# Patient Record
Sex: FEMALE | Race: WHITE | NOT HISPANIC OR LATINO | Employment: FULL TIME | URBAN - NONMETROPOLITAN AREA
[De-identification: names, ages, dates, MRNs, and addresses within clinical notes are randomized per-mention and may not be internally consistent; named-entity substitution may affect disease eponyms.]

---

## 2017-05-22 ENCOUNTER — COMMUNICATION - GICH (OUTPATIENT)
Dept: FAMILY MEDICINE | Facility: OTHER | Age: 47
End: 2017-05-22

## 2017-05-22 DIAGNOSIS — I10 ESSENTIAL (PRIMARY) HYPERTENSION: ICD-10-CM

## 2017-06-05 ENCOUNTER — COMMUNICATION - GICH (OUTPATIENT)
Dept: FAMILY MEDICINE | Facility: OTHER | Age: 47
End: 2017-06-05

## 2017-06-05 DIAGNOSIS — F34.1 DYSTHYMIC DISORDER: ICD-10-CM

## 2017-06-07 ENCOUNTER — HISTORY (OUTPATIENT)
Dept: FAMILY MEDICINE | Facility: OTHER | Age: 47
End: 2017-06-07

## 2017-06-07 ENCOUNTER — AMBULATORY - GICH (OUTPATIENT)
Dept: FAMILY MEDICINE | Facility: OTHER | Age: 47
End: 2017-06-07

## 2017-06-07 DIAGNOSIS — Z12.31 ENCOUNTER FOR SCREENING MAMMOGRAM FOR MALIGNANT NEOPLASM OF BREAST: ICD-10-CM

## 2017-06-07 DIAGNOSIS — Z80.3 FAMILY HISTORY OF MALIGNANT NEOPLASM OF BREAST: ICD-10-CM

## 2017-06-07 DIAGNOSIS — N62 HYPERTROPHY OF BREAST: ICD-10-CM

## 2017-06-07 DIAGNOSIS — Z01.818 ENCOUNTER FOR OTHER PREPROCEDURAL EXAMINATION: ICD-10-CM

## 2017-06-07 DIAGNOSIS — Z23 ENCOUNTER FOR IMMUNIZATION: ICD-10-CM

## 2017-06-07 DIAGNOSIS — F34.1 DYSTHYMIC DISORDER: ICD-10-CM

## 2017-06-07 DIAGNOSIS — I10 ESSENTIAL (PRIMARY) HYPERTENSION: ICD-10-CM

## 2017-06-07 LAB
ABSOLUTE BASOPHILS - HISTORICAL: 0.1 THOU/CU MM
ABSOLUTE EOSINOPHILS - HISTORICAL: 0.1 THOU/CU MM
ABSOLUTE LYMPHOCYTES - HISTORICAL: 1.8 THOU/CU MM (ref 0.9–2.9)
ABSOLUTE MONOCYTES - HISTORICAL: 0.4 THOU/CU MM
ABSOLUTE NEUTROPHILS - HISTORICAL: 4.2 THOU/CU MM (ref 1.7–7)
BASOPHILS # BLD AUTO: 1.1 %
EOSINOPHIL NFR BLD AUTO: 2 %
ERYTHROCYTE [DISTWIDTH] IN BLOOD BY AUTOMATED COUNT: 15.6 % (ref 11.5–15.5)
HCG UR QL: NEGATIVE
HCT VFR BLD AUTO: 38.1 % (ref 33–51)
HEMOGLOBIN: 12.8 G/DL (ref 12–16)
LYMPHOCYTES NFR BLD AUTO: 26.6 % (ref 20–44)
MCH RBC QN AUTO: 30.9 PG (ref 26–34)
MCHC RBC AUTO-ENTMCNC: 33.6 G/DL (ref 32–36)
MCV RBC AUTO: 92 FL (ref 80–100)
MONOCYTES NFR BLD AUTO: 6.4 %
NEUTROPHILS NFR BLD AUTO: 63.6 % (ref 42–72)
PLATELET # BLD AUTO: 335 THOU/CU MM (ref 140–440)
PMV BLD: 10.2 FL (ref 6.5–11)
RED BLOOD COUNT - HISTORICAL: 4.14 MIL/CU MM (ref 4–5.2)
WHITE BLOOD COUNT - HISTORICAL: 6.6 THOU/CU MM (ref 4.5–11)

## 2017-06-12 ENCOUNTER — HOSPITAL ENCOUNTER (OUTPATIENT)
Dept: RADIOLOGY | Facility: OTHER | Age: 47
End: 2017-06-12
Attending: FAMILY MEDICINE

## 2017-06-12 ENCOUNTER — HISTORY (OUTPATIENT)
Dept: RADIOLOGY | Facility: OTHER | Age: 47
End: 2017-06-12

## 2017-06-12 DIAGNOSIS — Z12.31 ENCOUNTER FOR SCREENING MAMMOGRAM FOR MALIGNANT NEOPLASM OF BREAST: ICD-10-CM

## 2017-06-16 ENCOUNTER — COMMUNICATION - GICH (OUTPATIENT)
Dept: FAMILY MEDICINE | Facility: OTHER | Age: 47
End: 2017-06-16

## 2017-06-19 ENCOUNTER — HISTORY (OUTPATIENT)
Dept: FAMILY MEDICINE | Facility: OTHER | Age: 47
End: 2017-06-19

## 2017-06-19 ENCOUNTER — OFFICE VISIT - GICH (OUTPATIENT)
Dept: FAMILY MEDICINE | Facility: OTHER | Age: 47
End: 2017-06-19

## 2017-06-19 DIAGNOSIS — I10 ESSENTIAL (PRIMARY) HYPERTENSION: ICD-10-CM

## 2017-07-06 ENCOUNTER — COMMUNICATION - GICH (OUTPATIENT)
Dept: FAMILY MEDICINE | Facility: OTHER | Age: 47
End: 2017-07-06

## 2017-07-10 ENCOUNTER — OFFICE VISIT - GICH (OUTPATIENT)
Dept: FAMILY MEDICINE | Facility: OTHER | Age: 47
End: 2017-07-10

## 2017-07-10 DIAGNOSIS — I10 ESSENTIAL (PRIMARY) HYPERTENSION: ICD-10-CM

## 2017-08-27 ENCOUNTER — COMMUNICATION - GICH (OUTPATIENT)
Dept: FAMILY MEDICINE | Facility: OTHER | Age: 47
End: 2017-08-27

## 2017-08-27 DIAGNOSIS — F34.1 DYSTHYMIC DISORDER: ICD-10-CM

## 2017-08-28 ENCOUNTER — COMMUNICATION - GICH (OUTPATIENT)
Dept: FAMILY MEDICINE | Facility: OTHER | Age: 47
End: 2017-08-28

## 2017-08-28 DIAGNOSIS — I10 ESSENTIAL (PRIMARY) HYPERTENSION: ICD-10-CM

## 2017-11-28 ENCOUNTER — COMMUNICATION - GICH (OUTPATIENT)
Dept: FAMILY MEDICINE | Facility: OTHER | Age: 47
End: 2017-11-28

## 2017-11-28 DIAGNOSIS — I10 ESSENTIAL (PRIMARY) HYPERTENSION: ICD-10-CM

## 2017-12-27 NOTE — PROGRESS NOTES
Patient Information     Patient Name MRN Sex Otis Trivedi [id number] Female 1970      Progress Notes by Cheyenne Bacon MD at 7/10/2017  9:15 AM     Author:  Cheyenne Bacon MD Service:  (none) Author Type:  Physician     Filed:  7/10/2017  3:34 PM Encounter Date:  7/10/2017 Status:  Signed     :  Cheyenne Bacon MD (Physician)            SUBJECTIVE:    Otsi Osorio is a 47 y.o. female who presents for follow up.     HPI Comments: Patient is tolerating new blood pressure medication without side effects. blood pressure generally improved, but not within goal range.   She is open to further adjustment of blood pressure medication.       REVIEW OF SYSTEMS:  ROS see HPI    OBJECTIVE:  /88  Pulse 60  Wt 83.9 kg (185 lb)  BMI 34.39 kg/m2    EXAM:   Physical Exam   Constitutional: She is oriented to person, place, and time and well-developed, well-nourished, and in no distress.   Eyes: Conjunctivae are normal.   Cardiovascular: Normal rate.    Pulmonary/Chest: Effort normal.   Neurological: She is alert and oriented to person, place, and time.   Skin: No rash noted.   Psychiatric: Mood and affect normal.       ASSESSMENT/PLAN:    ICD-10-CM    1. HYPERTENSION I10 lisinopril (PRINIVIL; ZESTRIL) 40 mg tablet        Plan:  Increase lisinopril to 40 mg daily. Follow up in 6-8 weeks for recheck, labs. If blood pressure still above goal, consider adding norvasc or HCTZ.       Cheyenne Bacon MD

## 2017-12-27 NOTE — PROGRESS NOTES
Patient Information     Patient Name MRN Sex Otis Trivedi [id number] Female 1970      Progress Notes by Marva Cheatham at 2017 10:23 AM     Author:  Marva Cheatahm Service:  (none) Author Type:  (none)     Filed:  2017 10:23 AM Date of Service:  2017 10:23 AM Status:  Signed     :  Marva Cheatham            Falls Risk Criteria:    Age 65 and older or under age 4        Sensory deficits    Poor vision    Use of ambulatory aides    Impaired judgment    Unable to walk independently    Meets High Risk criteria for falls:  no

## 2017-12-28 NOTE — TELEPHONE ENCOUNTER
Patient Information     Patient Name MRN Sex Otis Trivedi [id number] Female 1970      Telephone Encounter by Lucita Roblero at 2017  3:01 PM     Author:  Lucita Roblero Service:  (none) Author Type:  NURS- Student Practical Nurse     Filed:  2017  3:02 PM Encounter Date:  2017 Status:  Signed     :  Lucita Roblero (NURS- Student Practical Nurse)            Called and left VM on patients phone stating that a copy of her Pre-op was faxed successfully to [facility]. Instructed patient to call clinic back with any other questions.   Lucita Roblero ....................  2017   3:02 PM

## 2017-12-28 NOTE — PROGRESS NOTES
Patient Information     Patient Name MRN Sex Otis Trivedi [id number] Female 1970      Progress Notes by Cheyenne Bacon MD at 2017 10:00 AM     Author:  Cheyenne Bacon MD Service:  (none) Author Type:  Physician     Filed:  2017 12:23 PM Encounter Date:  2017 Status:  Signed     :  Cheyenne Bacon MD (Physician)            SUBJECTIVE:    Otis Osorio is a 47 y.o. female who presents for HTN.     HPI Comments: Patient presents for follow up on her blood pressure.   Patient has had an elevated blood pressure for years.   She is often inconsistent with her use of blood pressure medications.  She recently had a pre-op evaluation and her blood pressure was quite high.   She presented for elective breast reduction surgery, was actually wheeled in for surgery, when they decided to not proceed due to a blood pressure reading of 138/89 and because of her history.   The patient is now interested in additional medication to manage her blood pressure.  No chest, SOB or other symptoms.     REVIEW OF SYSTEMS:  ROS see HPI, ROS otherwise negative.     OBJECTIVE:  BP (!) 182/92  Pulse 68  Wt 84 kg (185 lb 3.2 oz)  LMP 2017 (Approximate)  BMI 34.43 kg/m2    EXAM:   Physical Exam   Constitutional: She is oriented to person, place, and time and well-developed, well-nourished, and in no distress.   Eyes: Conjunctivae are normal.   Cardiovascular: Normal rate.    Pulmonary/Chest: Effort normal.   Neurological: She is alert and oriented to person, place, and time.   Skin: No rash noted.   Psychiatric: Mood and affect normal.       ASSESSMENT/PLAN:    ICD-10-CM    1. HYPERTENSION I10 lisinopril (PRINIVIL; ZESTRIL) 20 mg tablet        Plan:  Long discussion with the patient on importance of managing blood pressure. Will start lisinopril at 20 mg daily. She is rescheduled for surgery the end of July (or perhaps mid August if she decided to go to [place] instead) so will have her  follow up in 2 weeks for recheck of her blood pressure. likely will need higher dose of lisinopril and/or and additional medication, and this is explained to the patient. Discussed lifestyle and dietary recommendations.     Cheyenne Bacon MD

## 2017-12-28 NOTE — TELEPHONE ENCOUNTER
Patient Information     Patient Name MRN Otis Pardo [id number] Female 1970      Telephone Encounter by Peri Woods RN at 2017 12:33 PM     Author:  Peri Woods RN Service:  (none) Author Type:  NURS- Registered Nurse     Filed:  2017 12:34 PM Encounter Date:  2017 Status:  Signed     :  Peri Woods RN (NURS- Registered Nurse)            Filled 16 90 x 3. Pharmacy alerted. Unable to complete prescription refill per RN Medication Refill Policy.................... Peri Woods RN ....................  2017   12:34 PM    Prescribing Provider: John Jaffe MD         Order Date: 2016  Ordered by: JOHN JAFFE  Medication:venlafaxine (EFFEXOR XR) 75 mg cp24 Extended-Release capsule    Qty:90 capsule  Ref:3  Start:2016    End:              Route:Oral                  GINNA:No   Class:eRx    Sig:Take 1 capsule by mouth once daily with a meal.    Pharmacy:[facility] - [place], [place] - [facility] [address]

## 2017-12-28 NOTE — TELEPHONE ENCOUNTER
Patient Information     Patient Name MRN Sex Otis Trivedi [id number] Female 1970      Telephone Encounter by Peri Woods RN at 2017  9:10 AM     Author:  Peri Woods RN Service:  (none) Author Type:  NURS- Registered Nurse     Filed:  2017  9:14 AM Encounter Date:  2017 Status:  Signed     :  Peri Woods RN (NURS- Registered Nurse)            Beta Blockers   Office visit in the past 12 months or per provider note.  Last visit with JOHN JAFFE was on: 2016 Pre-op with with Dr. Meyer  17 and 07/10/17 OV with Dr. Bacon address Dx of HTN  Next visit with JOHN JAFFE is on: No future appointment listed with this provider  Next visit with Family Practice is on: No future appointment listed in this department  Max refill for 12 months from last office visit or per provider note.  Prescription refilled per RN Medication Refill Policy.................... Peri Woods RN ....................  2017   9:12 AM

## 2017-12-28 NOTE — PROGRESS NOTES
Patient Information     Patient Name MRN Sex Otis Feliz [id number] Female 1970      Progress Notes by Nancy Meyer MD at 2017  3:45 PM     Author:  Nancy Meyer MD Service:  (none) Author Type:  Physician     Filed:  2017  4:21 PM Encounter Date:  2017 Status:  Signed     :  Nancy Meyer MD (Physician)              PREOPERATIVE CLEARANCE  Date of Exam: 2017    Nursing Notes:   Brielle Bowling  2017  3:40 PM  Signed  -This patient presents today for a Preoperative exam for this procedure: Breast reduction bilateral   Date of Surgery:    Surgeon:  Dr. Fletcher  Facility:  [facility]  -Fax:   [phone]  Haily Bowling LPN ...... 2017 3:35 PM      HPI:   Otis Osorio is a 47 y.o. Female here for preoperative clearance with history of hypertension for the above-noted or seizure. Her blood pressure is always high at the clinic with whitecoat syndrome noted. She also took her blood pressure pill little bit late today because she had been in [place] for her consultation. She has been checking it outside the clinic setting and has been well controlled.      Problem List:   Patient Active Problem List     Diagnosis  Code     BREAST CANCER, FAMILY HX Z80.3     FH: hemochromatosis Z83.49     DEPRESSION/ANXIETY F34.1     HYPERTENSION I10     Macromastia N62     White coat syndrome with diagnosis of hypertension I10      Histories:  Past Medical History:     Diagnosis  Date     Anxiety disorder      History of Helicobacter pylori infection      Hypertension       Past Surgical History:      Procedure  Laterality Date     BREAST BIOPSY       Breast bx - benign       KNEE ARTHROSCOPY  84, 90    Knee surgery x2       Social History     Social History        Marital status:       Spouse name: Michi     Number of children:  2     Years of education:  16     Occupational History        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[employer], ;      works as an  - he's not working much now due to illness (liver failure)-cirrhosis, awaiting transplant and having some neurological issues like Parkinson's                  Obstetric History       T2      L2     SAB0   TAB0   Ectopic0   Multiple0   Live Births0      Family History       Problem   Relation Age of Onset     Cancer-breast  Mother 49     metastatic breast cancer age 49;  age 50       Psychiatric illness  Father      Persistent depression       Psychiatric illness  Brother      anxiety disorder       Hypertension  Brother      Diabetes  Father      type 2 diabetes       Other  Father      hemachromatosis       Hypertension  Father       Allergies: Pollen extracts   Latex Allergy: no    Current Medications:  Current Outpatient Rx       Medication  Sig Dispense Refill     metoprolol succinate (TOPROL XL) 100 mg Sustained-Release tablet Take 1 tablet by mouth once daily. 90 tablet 3     venlafaxine (EFFEXOR XR) 75 mg cp24 Extended-Release capsule Take 1 capsule by mouth once daily with a meal. 90 capsule 3     Medications have been reviewed by me and are current to the best of my knowledge and ability.    Recent use of: no recent use of aspirin (ASA), NSAIDS or steroids    HABITS:   Social History       Substance Use Topics         Smoking status:   Never Smoker     Smokeless tobacco:   Never Used     Alcohol use   No      Comment: rare wine    Tetanus up to date TD , updated with TdaP today.    Proposed anesthesia: Per surgeonNone  Anesthesia  "Complications: None  History of abnormal bleeding : None  History of Blood Transfusions: No    Health Care Directive or Living Will: no    REVIEW OF SYSTEMS:  General: Denies general constitutional problems  Eyes: Denies problems  Ears/Nose/Throat: Denies problems  Cardiovascular: Denies problems  Respiratory: Denies problems  Gastrointestinal: Denies problems  Genitourinary - female: Menses irregular, no contraception  Musculoskeletal: Difficult for her to exercise in get the activity she wants with her breast size which impedes her physical activity  Skin: Denies problems  Neurologic: Denies problems  Psychiatric: Stress of spouse's ongoing health issues  Endocrine: Denies problems  Heme/Lymphatic: Denies problems  Allergic/Immunologic: Seasonal allergies    EXAM:   BP (!) 190/120  Pulse 62  Ht 1.562 m (5' 1.5\")  Wt 79.4 kg (175 lb)  LMP 04/24/2017 (Approximate)  BMI 32.53 kg/m2 Body mass index is 32.53 kg/(m^2).  General Appearance: Pleasant, alert, appropriate appearance for age. No acute distress  Ear Exam: Normal TM's bilaterally. Normal auditory canals and external ears. Non-tender.  Nose Exam: Normal external nose, mucus membranes, and septum.  OroPharynx Exam: Dental hygiene adequate. Normal buccal mucosa, crowded pharynx  Neck Exam: Supple, no masses or nodes, short  Thyroid Exam: No nodules or enlargement.  Chest/Respiratory Exam: Normal chest wall and respirations. Clear to auscultation.  Cardiovascular Exam: Regular rate and rhythm. S1, S2, no murmur, click, gallop, or rubs.  Musculoskeletal Exam: Back is straight and non-tender, full ROM of upper and lower extremities.  Skin: Normal. and no rash or abnormalities  Neurologic Exam: Nonfocal; symmetric DTRs, normal gross motor movement, tone, and coordination. No tremor.  Psychiatric Exam: Alert and oriented, appropriate affect.  Speech: normal   Affect: euthymic    DIAGNOSTICS:   1. EKG: EKG FINDINGS - Not indicated  2. CXR: Not indicated  3. Labs: "   Results for orders placed or performed in visit on 06/07/17      CBC WITH AUTO DIFFERENTIAL      Result  Value Ref Range    WHITE BLOOD COUNT         6.6 4.5 - 11.0 thou/cu mm    RED BLOOD COUNT           4.14 4.00 - 5.20 mil/cu mm    HEMOGLOBIN                12.8 12.0 - 16.0 g/dL    HEMATOCRIT                38.1 33.0 - 51.0 %    MCV                       92 80 - 100 fL    MCH                       30.9 26.0 - 34.0 pg    MCHC                      33.6 32.0 - 36.0 g/dL    RDW                       15.6 (H) 11.5 - 15.5 %    PLATELET COUNT            335 140 - 440 thou/cu mm    MPV                       10.2 6.5 - 11.0 fL    NEUTROPHILS               63.6 42.0 - 72.0 %    LYMPHOCYTES               26.6 20.0 - 44.0 %    MONOCYTES                 6.4 <12.0 %    EOSINOPHILS               2.0 <8.0 %    BASOPHILS                 1.1 <3.0 %    ABSOLUTE NEUTROPHILS      4.2 1.7 - 7.0 thou/cu mm    ABSOLUTE LYMPHOCYTES      1.8 0.9 - 2.9 thou/cu mm    ABSOLUTE MONOCYTES        0.4 <0.9 thou/cu mm    ABSOLUTE EOSINOPHILS      0.1 <0.5 thou/cu mm    ABSOLUTE BASOPHILS        0.1 <0.3 thou/cu mm   Urine pregnancy test (HCG), qualitative      Result  Value Ref Range    PREGNANCY,URINE           Negative Negative     I have personally reviewed the labs listed above.    4. Pre-op urine for pregnancy (for 12 yrs and older or menstruating): See above    IMPRESSION:   1. Preop examination    2. Macromastia    3. Screening mammogram, encounter for    4. White coat syndrome with diagnosis of hypertension    5. HYPERTENSION    6. DEPRESSION/ANXIETY    7. Need for Tdap vaccination    8. BREAST CANCER, FAMILY HX         For above listed surgery and anesthesia:   Patient is low risk for perioperative complications.  Instructed to continue to check blood pressure at home for good control since she is always elevated in the clinic setting.  RECOMMENDATIONS: proceed without further diagnostic evaluation and resume all medications  post-operative or per surgeon.  Last mammogram was greater than 1 year ago and we'll try to get mammogram completed prior to procedure however this is scheduled less than a week from now.  TdaP updated today.    Electronically Signed by:    Nancy Meyer MD  4:06 PM 6/7/2017

## 2017-12-28 NOTE — TELEPHONE ENCOUNTER
Patient Information     Patient Name MRN Otis Pardo [id number] Female 1970      Telephone Encounter by Jennifer Hernandez MD at 2017  3:44 PM     Author:  Jennifer Hernandez MD Service:  (none) Author Type:  Physician     Filed:  2017  3:44 PM Encounter Date:  2017 Status:  Signed     :  Jennifer Hernandez MD (Physician)            Please have her schedule a follow up visit.  Jennifer Hernandez MD

## 2017-12-28 NOTE — TELEPHONE ENCOUNTER
Patient Information     Patient Name MROtis Garnett [id number] Female 1970      Telephone Encounter by Marie Orr at 2017  3:24 PM     Author:  Marie Orr Service:  (none) Author Type:  (none)     Filed:  2017  3:27 PM Encounter Date:  2017 Status:  Signed     :  Marie Orr            Patient calling to let you know that her surgery was cancelled due to her BP being too high. She bought a monitor for home use and states it is consistentlyrunning high; from 168/109 to 140/89 at lowest. Wondering if there is another medication she should take or if she should increase what she is already on?  Please advise  Marie Orr LPN..............................2017  3:27 PM

## 2017-12-28 NOTE — TELEPHONE ENCOUNTER
Patient Information     Patient Name MRN Sex Otis Trivedi [id number] Female 1970      Telephone Encounter by Peri Woods RN at 2017  2:19 PM     Author:  Peri Woods RN Service:  (none) Author Type:  NURS- Registered Nurse     Filed:  2017  2:30 PM Encounter Date:  2017 Status:  Signed     :  Peri Woods RN (NURS- Registered Nurse)            Beta Blockers   Office visit in the past 12 months or per provider note.  Last visit with JOHN JAFFE was on: 2016 Pre-op with with Dr. Meyer  17 and 07/10/17 OV with Dr. Bacon address Dx of HTN  Next visit with JOHN JAFFE is on: No future appointment listed with this provider  Next visit with Family Practice is on: No future appointment listed in this department  Max refill for 12 months from last office visit or per provider note.  Prescription refilled per RN Medication Refill Policy.................... Peri Woods RN ....................  2017   2:30 PM

## 2017-12-28 NOTE — TELEPHONE ENCOUNTER
Patient Information     Patient Name MRN Otis Pardo [id number] Female 1970      Telephone Encounter by Marie Orr at 2017  3:54 PM     Author:  Marie Orr Service:  (none) Author Type:  (none)     Filed:  2017  3:54 PM Encounter Date:  2017 Status:  Signed     :  Marie Orr            Transferred to schedule appointment  Marie Orr LPN..............................2017  3:54 PM

## 2017-12-28 NOTE — TELEPHONE ENCOUNTER
Patient Information     Patient Name MRN Sex Otis Trivedi [id number] Female 1970      Telephone Encounter by Peri Woods RN at 2017  9:04 AM     Author:  Peri Woods RN Service:  (none) Author Type:  NURS- Registered Nurse     Filed:  2017  9:15 AM Encounter Date:  2017 Status:  Signed     :  Peri Woods RN (NURS- Registered Nurse)            Depression-in adults 18 and over  Serotonin/Norepinephrine Reuptake Inhibitors  Office visit in the past 12 months or as indicated in chart.  Should have clinic visit 1-2 months after initial prescription.  Last visit with JOHN JAFFE was on: 2016 Pre-op with with Dr. Meyer  Next visit with JOHN JAFFE is on: No future appointment listed with this provider  Max refills 12 months from last office visit or per providers notes.  Prescription refilled per RN Medication Refill Policy.................... Peri Woods RN ....................  2017   9:14 AM

## 2017-12-29 NOTE — H&P
Patient Information     Patient Name MRN Sex Otis Feliz [id number] Female 1970      H&P by Nancy Meyer MD at 2017  3:45 PM     Author:  Nancy Meyer MD Service:  (none) Author Type:  Physician     Filed:  2017  4:21 PM Encounter Date:  2017 Status:  Signed     :  Nancy Meyer MD (Physician)              PREOPERATIVE CLEARANCE  Date of Exam: 2017    Nursing Notes:   Brielle Bowling  2017  3:40 PM  Signed  -This patient presents today for a Preoperative exam for this procedure: Breast reduction bilateral   Date of Surgery:    Surgeon:  Dr. Fletcher  Facility:  [facility]  -Fax:   [phone]  Haily Bowling KALINA ...... 2017 3:35 PM      HPI:   Otis Osorio is a 47 y.o. Female here for preoperative clearance with history of hypertension for the above-noted or seizure. Her blood pressure is always high at the clinic with whitecoat syndrome noted. She also took her blood pressure pill little bit late today because she had been in [place] for her consultation. She has been checking it outside the clinic setting and has been well controlled.      Problem List:   Patient Active Problem List     Diagnosis  Code     BREAST CANCER, FAMILY HX Z80.3     FH: hemochromatosis Z83.49     DEPRESSION/ANXIETY F34.1     HYPERTENSION I10     Macromastia N62     White coat syndrome with diagnosis of hypertension I10      Histories:  Past Medical History:     Diagnosis  Date     Anxiety disorder      History of Helicobacter pylori infection      Hypertension       Past Surgical History:      Procedure  Laterality Date     BREAST BIOPSY       Breast bx - benign       KNEE ARTHROSCOPY  84, 90    Knee surgery x2       Social History     Social History        Marital status:       Spouse name: Michi     Number of children:  2     Years of education:  16     Occupational History        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[employer], ;      works as an  - he's not working much now due to illness (liver failure)-cirrhosis, awaiting transplant and having some neurological issues like Parkinson's                  Obstetric History       T2      L2     SAB0   TAB0   Ectopic0   Multiple0   Live Births0      Family History       Problem   Relation Age of Onset     Cancer-breast  Mother 49     metastatic breast cancer age 49;  age 50       Psychiatric illness  Father      Persistent depression       Psychiatric illness  Brother      anxiety disorder       Hypertension  Brother      Diabetes  Father      type 2 diabetes       Other  Father      hemachromatosis       Hypertension  Father       Allergies: Pollen extracts   Latex Allergy: no    Current Medications:  Current Outpatient Rx       Medication  Sig Dispense Refill     metoprolol succinate (TOPROL XL) 100 mg Sustained-Release tablet Take 1 tablet by mouth once daily. 90 tablet 3     venlafaxine (EFFEXOR XR) 75 mg cp24 Extended-Release capsule Take 1 capsule by mouth once daily with a meal. 90 capsule 3     Medications have been reviewed by me and are current to the best of my knowledge and ability.    Recent use of: no recent use of aspirin (ASA), NSAIDS or steroids    HABITS:   Social History       Substance Use Topics         Smoking status:   Never Smoker     Smokeless tobacco:   Never Used     Alcohol use   No      Comment: rare wine    Tetanus up to date TD , updated with TdaP today.    Proposed anesthesia: Per surgeonNonalondra  Anesthesia Complications:  "None  History of abnormal bleeding : None  History of Blood Transfusions: No    Health Care Directive or Living Will: no    REVIEW OF SYSTEMS:  General: Denies general constitutional problems  Eyes: Denies problems  Ears/Nose/Throat: Denies problems  Cardiovascular: Denies problems  Respiratory: Denies problems  Gastrointestinal: Denies problems  Genitourinary - female: Menses irregular, no contraception  Musculoskeletal: Difficult for her to exercise in get the activity she wants with her breast size which impedes her physical activity  Skin: Denies problems  Neurologic: Denies problems  Psychiatric: Stress of spouse's ongoing health issues  Endocrine: Denies problems  Heme/Lymphatic: Denies problems  Allergic/Immunologic: Seasonal allergies    EXAM:   BP (!) 190/120  Pulse 62  Ht 1.562 m (5' 1.5\")  Wt 79.4 kg (175 lb)  LMP 04/24/2017 (Approximate)  BMI 32.53 kg/m2 Body mass index is 32.53 kg/(m^2).  General Appearance: Pleasant, alert, appropriate appearance for age. No acute distress  Ear Exam: Normal TM's bilaterally. Normal auditory canals and external ears. Non-tender.  Nose Exam: Normal external nose, mucus membranes, and septum.  OroPharynx Exam: Dental hygiene adequate. Normal buccal mucosa, crowded pharynx  Neck Exam: Supple, no masses or nodes, short  Thyroid Exam: No nodules or enlargement.  Chest/Respiratory Exam: Normal chest wall and respirations. Clear to auscultation.  Cardiovascular Exam: Regular rate and rhythm. S1, S2, no murmur, click, gallop, or rubs.  Musculoskeletal Exam: Back is straight and non-tender, full ROM of upper and lower extremities.  Skin: Normal. and no rash or abnormalities  Neurologic Exam: Nonfocal; symmetric DTRs, normal gross motor movement, tone, and coordination. No tremor.  Psychiatric Exam: Alert and oriented, appropriate affect.  Speech: normal   Affect: euthymic    DIAGNOSTICS:   1. EKG: EKG FINDINGS - Not indicated  2. CXR: Not indicated  3. Labs:   Results for " orders placed or performed in visit on 06/07/17      CBC WITH AUTO DIFFERENTIAL      Result  Value Ref Range    WHITE BLOOD COUNT         6.6 4.5 - 11.0 thou/cu mm    RED BLOOD COUNT           4.14 4.00 - 5.20 mil/cu mm    HEMOGLOBIN                12.8 12.0 - 16.0 g/dL    HEMATOCRIT                38.1 33.0 - 51.0 %    MCV                       92 80 - 100 fL    MCH                       30.9 26.0 - 34.0 pg    MCHC                      33.6 32.0 - 36.0 g/dL    RDW                       15.6 (H) 11.5 - 15.5 %    PLATELET COUNT            335 140 - 440 thou/cu mm    MPV                       10.2 6.5 - 11.0 fL    NEUTROPHILS               63.6 42.0 - 72.0 %    LYMPHOCYTES               26.6 20.0 - 44.0 %    MONOCYTES                 6.4 <12.0 %    EOSINOPHILS               2.0 <8.0 %    BASOPHILS                 1.1 <3.0 %    ABSOLUTE NEUTROPHILS      4.2 1.7 - 7.0 thou/cu mm    ABSOLUTE LYMPHOCYTES      1.8 0.9 - 2.9 thou/cu mm    ABSOLUTE MONOCYTES        0.4 <0.9 thou/cu mm    ABSOLUTE EOSINOPHILS      0.1 <0.5 thou/cu mm    ABSOLUTE BASOPHILS        0.1 <0.3 thou/cu mm   Urine pregnancy test (HCG), qualitative      Result  Value Ref Range    PREGNANCY,URINE           Negative Negative     I have personally reviewed the labs listed above.    4. Pre-op urine for pregnancy (for 12 yrs and older or menstruating): See above    IMPRESSION:   1. Preop examination    2. Macromastia    3. Screening mammogram, encounter for    4. White coat syndrome with diagnosis of hypertension    5. HYPERTENSION    6. DEPRESSION/ANXIETY    7. Need for Tdap vaccination    8. BREAST CANCER, FAMILY HX         For above listed surgery and anesthesia:   Patient is low risk for perioperative complications.  Instructed to continue to check blood pressure at home for good control since she is always elevated in the clinic setting.  RECOMMENDATIONS: proceed without further diagnostic evaluation and resume all medications post-operative or per  surgeon.  Last mammogram was greater than 1 year ago and we'll try to get mammogram completed prior to procedure however this is scheduled less than a week from now.  TdaP updated today.    Electronically Signed by:    Nancy Meyer MD  4:06 PM 6/7/2017

## 2017-12-30 NOTE — NURSING NOTE
Patient Information     Patient Name MRN Sex Otis Trivedi [id number] Female 1970      Nursing Note by Brielle Bowling at 2017  3:45 PM     Author:  Brielle Bowling Service:  (none) Author Type:  (none)     Filed:  2017  3:40 PM Encounter Date:  2017 Status:  Signed     :  Brielle Bowling            -This patient presents today for a Preoperative exam for this procedure: Breast reduction bilateral   Date of Surgery:    Surgeon:  Dr. Fletcher  Facility:  [facility]  -Fax:   [phone]  Haily Bowling LPN ...... 2017 3:35 PM      /

## 2017-12-30 NOTE — NURSING NOTE
Patient Information     Patient Name MROtis Garnett [id number] Female 1970      Nursing Note by Helena Finn at 2017 10:00 AM     Author:  Helena Finn Service:  (none) Author Type:  (none)     Filed:  2017 10:25 AM Encounter Date:  2017 Status:  Signed     :  Helena Finn            Patient here for blood pressure check. Had to cancel surgery due to it.  Doesn't feel like her medication is working as well. States she has symptoms of shortness of breath and hand numbness.   Helena Finn LPN ..........2017 10:16 AM

## 2017-12-30 NOTE — NURSING NOTE
Patient Information     Patient Name MRN Otis Pardo [id number] Female 1970      Nursing Note by Helena Finn at 7/10/2017  9:15 AM     Author:  Helena Finn Service:  (none) Author Type:  (none)     Filed:  7/10/2017  9:56 AM Encounter Date:  7/10/2017 Status:  Signed     :  Helena Finn            Patient here for follow up on blood pressure   Helena Finn LPN ..........7/10/2017 9:51 AM

## 2018-01-22 ENCOUNTER — DOCUMENTATION ONLY (OUTPATIENT)
Dept: FAMILY MEDICINE | Facility: OTHER | Age: 48
End: 2018-01-22

## 2018-01-22 PROBLEM — Z83.49 FH: HEMOCHROMATOSIS: Status: ACTIVE | Noted: 2018-01-22

## 2018-01-22 PROBLEM — F34.1 DYSTHYMIC DISORDER: Status: ACTIVE | Noted: 2018-01-22

## 2018-01-22 PROBLEM — N62 MACROMASTIA: Status: ACTIVE | Noted: 2017-06-07

## 2018-01-22 PROBLEM — Z80.3 FAMILY HISTORY OF MALIGNANT NEOPLASM OF BREAST: Status: ACTIVE | Noted: 2018-01-22

## 2018-01-22 PROBLEM — I10 WHITE COAT SYNDROME WITH DIAGNOSIS OF HYPERTENSION: Status: ACTIVE | Noted: 2017-06-07

## 2018-01-22 RX ORDER — METOPROLOL SUCCINATE 100 MG/1
100 TABLET, EXTENDED RELEASE ORAL DAILY
COMMUNITY
Start: 2017-11-29 | End: 2018-05-08

## 2018-01-22 RX ORDER — LISINOPRIL 40 MG/1
40 TABLET ORAL DAILY
COMMUNITY
Start: 2017-07-10 | End: 2019-08-23

## 2018-01-22 RX ORDER — VENLAFAXINE HYDROCHLORIDE 75 MG/1
1 CAPSULE, EXTENDED RELEASE ORAL
COMMUNITY
Start: 2017-08-30 | End: 2018-05-08

## 2018-01-25 VITALS
WEIGHT: 175 LBS | HEART RATE: 62 BPM | HEART RATE: 60 BPM | WEIGHT: 185 LBS | SYSTOLIC BLOOD PRESSURE: 162 MMHG | SYSTOLIC BLOOD PRESSURE: 190 MMHG | HEIGHT: 62 IN | DIASTOLIC BLOOD PRESSURE: 88 MMHG | BODY MASS INDEX: 34.39 KG/M2 | BODY MASS INDEX: 32.2 KG/M2 | DIASTOLIC BLOOD PRESSURE: 120 MMHG

## 2018-01-25 VITALS
DIASTOLIC BLOOD PRESSURE: 92 MMHG | BODY MASS INDEX: 33.87 KG/M2 | HEART RATE: 68 BPM | WEIGHT: 185.2 LBS | SYSTOLIC BLOOD PRESSURE: 182 MMHG

## 2018-03-06 RX ORDER — METOPROLOL SUCCINATE 100 MG/1
100 TABLET, EXTENDED RELEASE ORAL DAILY
Qty: 30 TABLET | OUTPATIENT
Start: 2018-03-06

## 2018-03-06 NOTE — TELEPHONE ENCOUNTER
Refill inappropriate. Filled 11/29/17 #90 x 1 to [facility] alerted. Unable to complete prescription refill per RNMedication Refill Policy.................... Prei Batres ....................  3/6/2018   3:58 PM    Prescribing Provider: [id number] Cai MD       Order Date: 11/29/2017  Ordered by: PERI BATRES  Medication:metoprolol succinate (TOPROL XL) 100 mg Sustained-Release tablet  Prescription #:[id number]    Qty:90 tablet   Ref:1  Start:11/29/2017  End:              Route:Oral                  GINNA:No   Class:eRx    Sig:TAKE 1 TABLET BY MOUTH EVERY DAY    Pharmacy:[facility] [address] [place] [address] [address]  [address]              OF [facility] [address] - [phone]    Cosign accepted by JOHN JAFFE[[id number]] on 11/29/2017  9:25 AM

## 2018-03-26 ENCOUNTER — HEALTH MAINTENANCE LETTER (OUTPATIENT)
Age: 48
End: 2018-03-26

## 2018-05-08 DIAGNOSIS — I10 BENIGN ESSENTIAL HYPERTENSION: Primary | ICD-10-CM

## 2018-05-08 DIAGNOSIS — F34.1 DYSTHYMIC DISORDER: ICD-10-CM

## 2018-05-08 NOTE — LETTER
May 9, 2018      Otis DARDEN [facility]  [address] [address] [address]        This is to remind you that you are due for your follow up labs and blood pressure check. Your last visit was on 07/10/2017.     Additional refills of your medication require you to complete this visit.    Please call [phone] to schedule your appointment.    Thank you for choosing [facility] for your health care needs.    Sincerely,      Refill RN  [facility]

## 2018-05-09 RX ORDER — METOPROLOL SUCCINATE 100 MG/1
TABLET, EXTENDED RELEASE ORAL
Qty: 30 TABLET | Refills: 0 | Status: SHIPPED | OUTPATIENT
Start: 2018-05-09 | End: 2018-06-15

## 2018-05-09 RX ORDER — VENLAFAXINE HYDROCHLORIDE 75 MG/1
CAPSULE, EXTENDED RELEASE ORAL
Qty: 30 CAPSULE | Refills: 0 | Status: SHIPPED | OUTPATIENT
Start: 2018-05-09 | End: 2018-06-15

## 2018-05-09 NOTE — TELEPHONE ENCOUNTER
Metoprolol and Effexor  LOV-07/10/2017. Patient due for follow up labs and B/P check. Reminder letter sent.     Routing refill request to provider for review/approval because: B/P out of range    BP Readings from Last 3 Encounters:   07/10/17 162/88   06/19/17 (!) 182/92   06/07/17 (!) 190/120      Unable to complete prescription refill per RNMedication Refill Policy.................... Peri Woods ....................  5/9/2018   2:41 PM

## 2018-06-19 ENCOUNTER — HOSPITAL ENCOUNTER (OUTPATIENT)
Dept: MRI IMAGING | Facility: OTHER | Age: 48
Discharge: HOME | End: 2018-06-19
Attending: NURSE PRACTITIONER | Admitting: NURSE PRACTITIONER
Payer: COMMERCIAL

## 2018-06-19 DIAGNOSIS — S59.902A ELBOW INJURY, LEFT, INITIAL ENCOUNTER: ICD-10-CM

## 2018-06-19 PROCEDURE — 73221 MRI JOINT UPR EXTREM W/O DYE: CPT | Mod: LT

## 2018-07-23 NOTE — PROGRESS NOTES
Patient Information     Patient Name  Otis Osorio MRN  [id number] Sex  Female   1970      Letter by Cara Espinosa MD at      Author:  Cara Espinosa MD Service:  (none) Author Type:  (none)    Filed:   Date of Service:   Status:  (Other)       [facility]  [address]  [place] [address]  [phone]         Otis Osorio   [address] [facility] [address]  [facility] [address]      2017  Date of Breast Imagin2017 10:33 AM    Dear Ms. Osorio:    We are pleased to inform you that the result of your recent breast imaging examination is normal/benign (not cancer).    A report of your results was sent to your health care provider(s).    Your mammogram shows that your breast tissue is dense.  Dense breast tissue is relatively common and is found in more than 50 percent of women. Dense breast tissue may be associated with a slight increased risk of breast cancer and may make cancer more difficult to detect by mammogram. However, the actual risk of breast cancer for women with dense breast tissue is still low. This information is given to you to raise your own awareness and help you talk with your primary care provider. Together, you can decide which screening options are right for you.     Your images will become part of your medical file here at [facility] and will be available for your continuing care. You are responsible for informing any new health care provider or breast imaging facility of the date and location of this examination.    Mammography remains the gold standard and is the most accurate method for early detection. Mammograms are the only medical imaging test shown to reduce breast cancer deaths. Not all cancers are found through mammography. If you notice any new changes in your breast(s) please inform your healthcare provider.     Thank you for choosing [facility] to participate in your healthcare  needs.     [facility] Recommendations for Early Breast Cancer Detection   in Women without Symptoms  When to start having mammograms to screen for breast cancer, and how often to have them, is a personal decision. It should be based on your preferences, your values and your risk for developing breast cancer. [facility] recommends that you and your health care provider together determine when mammograms are right for you.    [facility] recommends the following guidelines for women who have an average risk for breast cancer, based on American Cancer Society guidelines:    Age 40 to 44: Mammograms are optional.     Age 45 to 54: Have a mammogram every year.           Age 55 and older: Have a mammogram every year, or transition to having one every 2 years. Continue to have mammograms as long as your health is good.    If you have a higher than average risk for breast cancer, your health care provider may recommend a different schedule.

## 2018-07-31 ENCOUNTER — HOSPITAL ENCOUNTER (OUTPATIENT)
Dept: GENERAL RADIOLOGY | Facility: OTHER | Age: 48
Discharge: HOME | End: 2018-07-31
Attending: PHYSICIAN ASSISTANT | Admitting: PHYSICIAN ASSISTANT
Payer: COMMERCIAL

## 2018-07-31 ENCOUNTER — OFFICE VISIT (OUTPATIENT)
Dept: FAMILY MEDICINE | Facility: OTHER | Age: 48
End: 2018-07-31
Attending: PHYSICIAN ASSISTANT
Payer: COMMERCIAL

## 2018-07-31 VITALS
BODY MASS INDEX: 29.44 KG/M2 | WEIGHT: 160 LBS | TEMPERATURE: 97.6 F | RESPIRATION RATE: 20 BRPM | DIASTOLIC BLOOD PRESSURE: 102 MMHG | HEART RATE: 88 BPM | SYSTOLIC BLOOD PRESSURE: 180 MMHG | HEIGHT: 62 IN

## 2018-07-31 DIAGNOSIS — S99.922A INJURY OF TOE, LEFT, INITIAL ENCOUNTER: ICD-10-CM

## 2018-07-31 DIAGNOSIS — S92.912A CLOSED FRACTURE OF PROXIMAL PHALANX OF TOE OF LEFT FOOT: Primary | ICD-10-CM

## 2018-07-31 PROCEDURE — 99213 OFFICE O/P EST LOW 20 MIN: CPT | Performed by: PHYSICIAN ASSISTANT

## 2018-07-31 PROCEDURE — 73660 X-RAY EXAM OF TOE(S): CPT | Mod: LT

## 2018-07-31 RX ORDER — OXYCODONE HYDROCHLORIDE 5 MG/1
TABLET ORAL
Qty: 12 TABLET | Refills: 0 | Status: SHIPPED | OUTPATIENT
Start: 2018-07-31 | End: 2019-08-23

## 2018-07-31 RX ORDER — VENLAFAXINE HYDROCHLORIDE 75 MG/1
1 CAPSULE, EXTENDED RELEASE ORAL
COMMUNITY
Start: 2017-07-27 | End: 2019-08-23

## 2018-07-31 RX ORDER — LISINOPRIL 40 MG/1
1 TABLET ORAL
COMMUNITY
Start: 2017-07-26 | End: 2019-08-23

## 2018-07-31 RX ORDER — METOPROLOL SUCCINATE 100 MG/1
1 TABLET, EXTENDED RELEASE ORAL
COMMUNITY
Start: 2017-07-27 | End: 2019-08-23

## 2018-07-31 ASSESSMENT — PAIN SCALES - GENERAL: PAINLEVEL: MILD PAIN (2)

## 2018-07-31 NOTE — PATIENT INSTRUCTIONS
Toe injury  XR fracture to proximal phalanx of 4th toe on left  Buddy tape to adjacent to for about 1 week, then as needed  Elevate and ice 10-20 minutes every 1-2 hours  Ibuprofen 600-800 mg every 6-8 hours, max 2400 mg/day. Take with food. Schedule this for 3-5 days, than as needed.   Tylenol 650-1000 mg every 4-6 hours, max 4000 mg/day  Oxycodone 5 mg tablet, take 1-2 tablet every 4-6 hours as needed for pain  Follow up with PCP if symptoms persist or worsen  Seek immediate care for    Pain or swelling gets worse    Toe becomes cold, blue, numb, or tingly    You can t move the toe    Signs of infection: fever, redness, warmth, swelling, or drainage from the wound or cast    Fever of 100.4 F (38 C) or higher, or as directed by your healthcare provider    Closed Toe Fracture  Your toe is broken (fractured). This causes local pain, swelling, and sometimes bruising. This injury usually takes about 4 to 6 weeks to heal, but can sometimes take longer. Toe injuries are often treated by taping the injured toe to the next one (buddy taping). This protects the injured toe and holds it in position.     If the toenail has been severely injured, it may fall off in 1 to 2 weeks. It takes up to 12 months for a new toenail to grow back.  Home care  Follow these guidelines when caring for yourself at home:    You may be given a cast shoe to wear to keep your toe from moving. If not, you can use a sandal or any shoe that doesn t put pressure on the injured toe until the swelling and pain go away. If using a sandal, be careful not to strike your foot against anything. Another injury could make the fracture worse. If you were given crutches, don t put full weight on the injured foot until you can do so without pain, or as directed by your healthcare provider.    Keep your foot elevated to reduce pain and swelling. When sleeping, put a pillow under the injured leg. When sitting, support the injured leg so it is above your waist. This  is very important during the first 2 days (48 hours).    Put an ice pack on the injured area. Do this for 20 minutes every 1 to 2 hours the first day for pain relief. You can make an ice pack by wrapping a plastic bag of ice cubes in a thin towel. As the ice melts, be careful that any cloth or paper tape doesn t get wet. Continue using the ice pack 3 to 4 times a day for the next 2 days. Then use the ice pack as needed to ease pain and swelling.    If buddy tape was used and it becomes wet or dirty, change it. You may replace it with paper, plastic, or cloth tape. Cloth tape and paper tapes must be kept dry.    You may use acetaminophen or ibuprofen to control pain, unless another pain medicine was prescribed. If you have chronic liver or kidney disease, talk with your healthcare provider before using these medicines. Also talk with your provider if you ve had a stomach ulcer or gastrointestinal bleeding.    You may return to sports or physical education activities after 4 weeks when you can run without pain, or as directed by your healthcare provider.  Follow-up care  Follow up with your healthcare provider in 1 week, or as advised. This is to make sure the bone is healing the way it should.  X-rays may be taken. You will be told of any new findings that may affect your care.  When to seek medical advice  Call your healthcare provider right away if any of these occur:    Pain or swelling gets worse    The cast/splint cracks    The cast and padding get wet and stays wet more than 24 hours    Bad odor from the cast/splint or wound fluid stains the cast    Tightness or pressure under the cast/splint gets worse    Toe becomes cold, blue, numb, or tingly    You can t move the toe    Signs of infection: fever, redness, warmth, swelling, or drainage from the wound or cast    Fever of 100.4 F (38 C) or higher, or as directed by your healthcare provider  Date Last Reviewed: 2/1/2017 2000-2017 [employer]  [address] [place] [address]. All rights reserved. This information is not intended as a substitute for professional medical care. Always follow your healthcare professional's instructions.

## 2018-07-31 NOTE — PROGRESS NOTES
"SUBJECTIVE:  Otis DARDEN Jayshree Frank is a 48 year old female who sustained a left toe injury 1 day ago.   Mechanism of injury: she kicked the corner of a door when she was up at night last evening.  Immediate symptoms: immediate pain, swelling bruising.  Symptoms have been unchanged since that time. Prior history of related problems: no prior problems with this area in the past.    Quality: ache, constant, worse with walking  Severity 2 - 8/10  Aggravated bearing weight. Alleviated rest  Her BP is elevated today in visit, she states she didn't take her BP medication this AM. She is otherwise careful to take her medications and monitor her BP    Past Medical History:   Diagnosis Date     Anxiety disorder     No Comments Provided     Essential (primary) hypertension     No Comments Provided     Personal history of other infectious and parasitic diseases     No Comments Provided     Current Outpatient Prescriptions   Medication     lisinopril (PRINIVIL/ZESTRIL) 40 MG tablet     metoprolol succinate (TOPROL-XL) 100 MG 24 hr tablet     order for DME     oxyCODONE IR (ROXICODONE) 5 MG tablet     venlafaxine (EFFEXOR-XR) 75 MG 24 hr capsule     lisinopril (PRINIVIL/ZESTRIL) 40 MG tablet     metoprolol succinate (TOPROL-XL) 100 MG 24 hr tablet     venlafaxine (EFFEXOR-XR) 75 MG 24 hr capsule     No current facility-administered medications for this visit.         Allergies   Allergen Reactions     Pollen Extract      Other reaction(s): Runny Nose  Itchy eyes         OBJECTIVE:  Vitals:    07/31/18 1226   BP: (!) 180/102   BP Location: Left arm   Patient Position: Sitting   Cuff Size: Adult Regular   Pulse: 88   Resp: 20   Temp: 97.6  F (36.4  C)   TempSrc: Tympanic   Weight: 160 lb (72.6 kg)   Height: 5' 2\" (1.575 m)       Vital signs as noted above.  Appearance: in no apparent distress.  Foot exam: left foot  Inspection: 4th and 3rd toes have ecchymosis and mild erythema, mild swelling. Normal alignment  Palpation: TTP " 3 and 4th toes, 3 and 4th metatarsal  Neurovascular: normal pulses, cap refill, sensation to soft touch, temperature  ROM: limited ROM 4th toe        ASSESSMENT:  (A84.301Q) Closed fracture of proximal phalanx of toe of left foot  (primary encounter diagnosis)  Plan: order for DME, oxyCODONE IR (ROXICODONE) 5 MG         tablet      (R46.698D) Injury of toe, left, initial encounter  Plan: XR Toe Left G/E 2 Views    Toe injury  XR fracture to proximal phalanx of 4th toe on left, minimally displaced  Buddy tape to adjacent to for about 1 week, then as needed  Elevate and ice 10-20 minutes every 1-2 hours  Ibuprofen 600-800 mg every 6-8 hours, max 2400 mg/day. Take with food. Schedule this for 3-5 days, than as needed.   Tylenol 650-1000 mg every 4-6 hours, max 4000 mg/day  Oxycodone 5 mg tablet, take 1-2 tablet every 4-6 hours as needed for pain  Follow up with PCP if symptoms persist or worsen  Patient received verbal and written instruction including review of warning signs    Divina Bourne PA-C on 7/31/2018 at 4:42 PM

## 2018-07-31 NOTE — NURSING NOTE
Patient presents to the clinic for left 4th toe injury. Jammed it on the door last night. States it was popped out of place last night, did tape them back together.   Lucita Dubon LPN............. July 31, 2018 12:25 PM

## 2018-07-31 NOTE — MR AVS SNAPSHOT
After Visit Summary   7/31/2018    Otis Frank    MRN: [id number]           Patient Information     Date Of Birth          1970        Visit Information        Provider Department      7/31/2018 12:15 PM Divina Bourne PA-C [facility]        Today's Diagnoses     Closed fracture of proximal phalanx of toe of left foot    -  1    Injury of toe, left, initial encounter          Care Instructions    Toe injury  XR fracture to proximal phalanx of 4th toe on left  Buddy tape to adjacent to for about 1 week, then as needed  Elevate and ice 10-20 minutes every 1-2 hours  Ibuprofen 600-800 mg every 6-8 hours, max 2400 mg/day. Take with food. Schedule this for 3-5 days, than as needed.   Tylenol 650-1000 mg every 4-6 hours, max 4000 mg/day  Oxycodone 5 mg tablet, take 1-2 tablet every 4-6 hours as needed for pain  Follow up with PCP if symptoms persist or worsen  Seek immediate care for    Pain or swelling gets worse    Toe becomes cold, blue, numb, or tingly    You can t move the toe    Signs of infection: fever, redness, warmth, swelling, or drainage from the wound or cast    Fever of 100.4 F (38 C) or higher, or as directed by your healthcare provider    Closed Toe Fracture  Your toe is broken (fractured). This causes local pain, swelling, and sometimes bruising. This injury usually takes about 4 to 6 weeks to heal, but can sometimes take longer. Toe injuries are often treated by taping the injured toe to the next one (buddy taping). This protects the injured toe and holds it in position.     If the toenail has been severely injured, it may fall off in 1 to 2 weeks. It takes up to 12 months for a new toenail to grow back.  Home care  Follow these guidelines when caring for yourself at home:    You may be given a cast shoe to wear to keep your toe from moving. If not, you can use a sandal or any shoe that doesn t put pressure on the injured toe until the swelling and  pain go away. If using a sandal, be careful not to strike your foot against anything. Another injury could make the fracture worse. If you were given crutches, don t put full weight on the injured foot until you can do so without pain, or as directed by your healthcare provider.    Keep your foot elevated to reduce pain and swelling. When sleeping, put a pillow under the injured leg. When sitting, support the injured leg so it is above your waist. This is very important during the first 2 days (48 hours).    Put an ice pack on the injured area. Do this for 20 minutes every 1 to 2 hours the first day for pain relief. You can make an ice pack by wrapping a plastic bag of ice cubes in a thin towel. As the ice melts, be careful that any cloth or paper tape doesn t get wet. Continue using the ice pack 3 to 4 times a day for the next 2 days. Then use the ice pack as needed to ease pain and swelling.    If buddy tape was used and it becomes wet or dirty, change it. You may replace it with paper, plastic, or cloth tape. Cloth tape and paper tapes must be kept dry.    You may use acetaminophen or ibuprofen to control pain, unless another pain medicine was prescribed. If you have chronic liver or kidney disease, talk with your healthcare provider before using these medicines. Also talk with your provider if you ve had a stomach ulcer or gastrointestinal bleeding.    You may return to sports or physical education activities after 4 weeks when you can run without pain, or as directed by your healthcare provider.  Follow-up care  Follow up with your healthcare provider in 1 week, or as advised. This is to make sure the bone is healing the way it should.  X-rays may be taken. You will be told of any new findings that may affect your care.  When to seek medical advice  Call your healthcare provider right away if any of these occur:    Pain or swelling gets worse    The cast/splint cracks    The cast and padding get wet and stays  "wet more than 24 hours    Bad odor from the cast/splint or wound fluid stains the cast    Tightness or pressure under the cast/splint gets worse    Toe becomes cold, blue, numb, or tingly    You can t move the toe    Signs of infection: fever, redness, warmth, swelling, or drainage from the wound or cast    Fever of 100.4 F (38 C) or higher, or as directed by your healthcare provider  Date Last Reviewed: 2/1/2017 2000-2017 [employer]. [address] [place] [address]. All rights reserved. This information is not intended as a substitute for professional medical care. Always follow your healthcare professional's instructions.                Follow-ups after your visit        Follow-up notes from your care team     Return if symptoms worsen or fail to improve.      Who to contact     If you have questions or need follow up information about today's clinic visit or your schedule please contact [facility] directly at [phone].  Normal or non-critical lab and imaging results will be communicated to you by [employer], letter or phone within 4 business days after the clinic has received the results. If you do not hear from us within 7 days, please contact the clinic through [employer] or phone. If you have a critical or abnormal lab result, we will notify you by phone as soon as possible.  Submit refill requests through Link[employer] or call your pharmacy and they will forward the refill request to us. Please allow 3 business days for your refill to be completed.          Additional Information About Your Visit        [employer] Information     Linktone lets you send messages to your doctor, view your test results, renew your prescriptions, schedule appointments and more. To sign up, go to [email or web address] . Click on \"Log in\" on the left side of the screen, which will take you to the Welcome page. Then click on \"Sign up Now\" on the right side of the page.     You will be asked " "to enter the access code listed below, as well as some personal information. Please follow the directions to create your username and password.     Your access code is: [email or web address]  Expires: 10/29/2018  1:17 PM     Your access code will  in 90 days. If you need help or a new code, please call your [place] clinic or [phone].        Care EveryWhere ID     This is your Care EveryWhere ID. This could be used by other organizations to access your [facility] medical records  [id number]        Your Vitals Were     Pulse Temperature Respirations Height Breastfeeding? BMI (Body Mass Index)    88 97.6  F (36.4  C) (Tympanic) 20 5' 2\" (1.575 m) No 29.26 kg/m2       Blood Pressure from Last 3 Encounters:   18 (!) 180/102   07/10/17 162/88   17 (!) 182/92    Weight from Last 3 Encounters:   18 160 lb (72.6 kg)   07/10/17 185 lb (83.9 kg)   17 185 lb 3.2 oz (84 kg)              We Performed the Following     Crutches     XR Toe Left G/E 2 Views          Today's Medication Changes          These changes are accurate as of 18  1:17 PM.  If you have any questions, ask your nurse or doctor.               Start taking these medicines.        Dose/Directions    order for DME   Used for:  Closed fracture of proximal phalanx of toe of left foot   Started by:  Divina Bourne PA-C        Equipment being ordered: surgical shoe   Quantity:  1 each   Refills:  0       oxyCODONE IR 5 MG tablet   Commonly known as:  ROXICODONE   Used for:  Closed fracture of proximal phalanx of toe of left foot   Started by:  Divina Bourne PA-C        Take 1-2 tablets every 6 hours as needed for pain   Quantity:  12 tablet   Refills:  0            Where to get your medicines      Some of these will need a paper prescription and others can be bought over the counter.  Ask your nurse if you have questions.     Bring a paper prescription for each of these medications     order for DME    oxyCODONE IR 5 MG tablet             "   Information about OPIOIDS     PRESCRIPTION OPIOIDS: WHAT YOU NEED TO KNOW   We gave you an opioid (narcotic) pain medicine. It is important to manage your pain, but opioids are not always the best choice. You should first try all the other options your care team gave you. Take this medicine for as short a time (and as few doses) as possible.     These medicines have risks:    DO NOT drive when on new or higher doses of pain medicine. These medicines can affect your alertness and reaction times, and you could be arrested for driving under the influence (DUI). If you need to use opioids long-term, talk to your care team about driving.    DO NOT operate heave machinery    DO NOT do any other dangerous activities while taking these medicines.     DO NOT drink any alcohol while taking these medicines.      If the opioid prescribed includes acetaminophen, DO NOT take with any other medicines that contain acetaminophen. Read all labels carefully. Look for the word  acetaminophen  or  Tylenol.  Ask your pharmacist if you have questions or are unsure.    You can get addicted to pain medicines, especially if you have a history of addiction (chemical, alcohol or substance dependence). Talk to your care team about ways to reduce this risk.    Store your pills in a secure place, locked if possible. We will not replace any lost or stolen medicine. If you don t finish your medicine, please throw away (dispose) as directed by your pharmacist. The [place] [employer]ution [employer] has more information about safe disposal: https://www.pca.[facility].us/living-green/managing-unwanted-medications.     All opioids tend to cause constipation. Drink plenty of water and eat foods that have a lot of fiber, such as fruits, vegetables, prune juice, apple juice and high-fiber cereal. Take a laxative (Miralax, milk of magnesia, Colace, Senna) if you don t move your bowels at least every other day.          Primary Care Provider Office Phone #  Fax #    Jennifer FINK Juan Rodríguez [phone] [address]  [place] [address]        Equal Access to Services     ROSANA ISABEL : Avinash noel mariluzariela Noristaz, wadejada luqadaha, qaybta kaalmada shawn, velma davidin hayaacindy rodriguezroberth hurtado laviniciocindy rogers. So [facility] [phone].    ATENCIÓN: Si habla español, tiene a flores disposición servicios gratuitos de asistencia lingüística. Llame al [phone].    We comply with applicable federal civil rights laws and [place] laws. We do not discriminate on the basis of race, color, national origin, age, disability, sex, sexual orientation, or gender identity.            Thank you!     Thank you for choosing [facility]  for your care. Our goal is always to provide you with excellent care. Hearing back from our patients is one way we can continue to improve our services. Please take a few minutes to complete the written survey that you may receive in the mail after your visit with us. Thank you!             Your Updated Medication List - Protect others around you: Learn how to safely use, store and throw away your medicines at www.disposemymeds.org.          This list is accurate as of 7/31/18  1:17 PM.  Always use your most recent med list.                   Brand Name Dispense Instructions for use Diagnosis    * lisinopril 40 MG tablet    PRINIVIL/ZESTRIL     Take 40 mg by mouth daily        * lisinopril 40 MG tablet    PRINIVIL/ZESTRIL     Take 1 tablet by mouth        * metoprolol succinate 100 MG 24 hr tablet    TOPROL-XL     Take 1 tablet by mouth        * metoprolol succinate 100 MG 24 hr tablet    TOPROL-XL    30 tablet    TAKE 1 TABLET BY MOUTH ONCE DAILY    Benign essential hypertension       order for DME     1 each    Equipment being ordered: surgical shoe    Closed fracture of proximal phalanx of toe of left foot       oxyCODONE IR 5 MG tablet    ROXICODONE    12 tablet    Take 1-2 tablets every 6 hours as needed  for pain    Closed fracture of proximal phalanx of toe of left foot       * venlafaxine 75 MG 24 hr capsule    EFFEXOR-XR     Take 1 capsule by mouth        * venlafaxine 75 MG 24 hr capsule    EFFEXOR-XR    30 capsule    TAKE 1 CAPSULE BY MOUTH EVERY DAY WITH A MEAL    Dysthymic disorder       * Notice:  This list has 6 medication(s) that are the same as other medications prescribed for you. Read the directions carefully, and ask your doctor or other care provider to review them with you.

## 2018-08-06 DIAGNOSIS — I10 BENIGN ESSENTIAL HYPERTENSION: ICD-10-CM

## 2018-08-06 NOTE — LETTER
August 9, 2018      Otis Frank  [address] [place] [address]    Dear Otis,     This is to remind you that you are due for your annual office visit with JOHN FLORES MD.  Your last visit was on 07/11/2017.     Additional refills of your medication require you to complete this visit.    Please call [phone] to schedule your appointment.    Thank you for choosing [facility] and [facility] for your health care needs.    Sincerely,      Refill RN  [facility]

## 2018-08-09 RX ORDER — METOPROLOL SUCCINATE 100 MG/1
100 TABLET, EXTENDED RELEASE ORAL DAILY
Qty: 30 TABLET | Refills: 0 | Status: SHIPPED | OUTPATIENT
Start: 2018-08-09 | End: 2019-08-27

## 2018-08-09 NOTE — TELEPHONE ENCOUNTER
Please call.  Refill medication.  Needs to be seen in clinic prior to further refills.  Jenn Shelton PA-C.......... 8/9/2018 12:34 PM

## 2018-08-09 NOTE — TELEPHONE ENCOUNTER
PCP is out will route to teamlet for consideration.     Metoprolol  LOV-07/10/2017. Due for exam.  Reminder letter sent.     Routing refill request to provider for review/approval because: B/P out of range:      Blood pressure under 140/90 in past 12 months           BP Readings from Last 3 Encounters:   07/31/18 (!) 180/102   07/10/17 162/88   06/19/17 (!) 182/92          Unable to complete prescription refill per RNMedication Refill Policy.................... Peri Woods ....................  8/9/2018   8:35 AM

## 2018-08-09 NOTE — TELEPHONE ENCOUNTER
After birth date was verified, spoke with patient and let her know the below information. No further questions or concerns at this time.        Rah Key LPN 08/09/18 1:21 PM

## 2018-09-16 DIAGNOSIS — I10 BENIGN ESSENTIAL HYPERTENSION: ICD-10-CM

## 2018-09-18 RX ORDER — METOPROLOL SUCCINATE 100 MG/1
TABLET, EXTENDED RELEASE ORAL
Qty: 30 TABLET | Refills: 0 | OUTPATIENT
Start: 2018-09-18

## 2018-09-18 NOTE — TELEPHONE ENCOUNTER
"Refill request for metoprolol inappropriate. Medication refused by PCP on 9/11/19- see 09/11/18, 08/09/18 and 08/06/18 refill encounter. Patient needs an OV before further refills. Contacted pharmacy who will continue to assist in alerting patient of need for OV. 'We did let her know the last time this was refused. We can try and let her know again\". Patient available via phone today and states, \" I have 2 pills left. She is always so hard to get into see. Writer recommended patient see the next available provider. Patient agreed. Transferred to scheduling to set up OV. Unable to complete prescription refill per RNMedication Refill Policy.................... Peri Woods ....................  9/18/2018   2:25 PM          "

## 2019-08-05 ENCOUNTER — HOSPITAL ENCOUNTER (OUTPATIENT)
Dept: MAMMOGRAPHY | Facility: OTHER | Age: 49
Discharge: HOME | End: 2019-08-05
Attending: FAMILY MEDICINE | Admitting: FAMILY MEDICINE
Payer: COMMERCIAL

## 2019-08-05 DIAGNOSIS — Z12.39 BREAST SCREENING, UNSPECIFIED: ICD-10-CM

## 2019-08-05 PROCEDURE — 77063 BREAST TOMOSYNTHESIS BI: CPT

## 2019-08-23 ENCOUNTER — OFFICE VISIT (OUTPATIENT)
Dept: FAMILY MEDICINE | Facility: OTHER | Age: 49
End: 2019-08-23
Attending: FAMILY MEDICINE
Payer: COMMERCIAL

## 2019-08-23 VITALS
OXYGEN SATURATION: 99 % | SYSTOLIC BLOOD PRESSURE: 180 MMHG | TEMPERATURE: 98.2 F | DIASTOLIC BLOOD PRESSURE: 102 MMHG | HEART RATE: 102 BPM | RESPIRATION RATE: 18 BRPM | BODY MASS INDEX: 29.01 KG/M2 | WEIGHT: 158.6 LBS

## 2019-08-23 DIAGNOSIS — R06.81 WITNESSED APNEIC SPELLS: ICD-10-CM

## 2019-08-23 DIAGNOSIS — I10 ESSENTIAL HYPERTENSION: Primary | ICD-10-CM

## 2019-08-23 PROCEDURE — 99214 OFFICE O/P EST MOD 30 MIN: CPT | Performed by: FAMILY MEDICINE

## 2019-08-23 RX ORDER — AMLODIPINE BESYLATE 5 MG/1
5 TABLET ORAL DAILY
Qty: 30 TABLET | Refills: 1 | Status: SHIPPED | OUTPATIENT
Start: 2019-08-23 | End: 2019-10-19

## 2019-08-23 ASSESSMENT — PATIENT HEALTH QUESTIONNAIRE - PHQ9: SUM OF ALL RESPONSES TO PHQ QUESTIONS 1-9: 3

## 2019-08-23 ASSESSMENT — PAIN SCALES - GENERAL: PAINLEVEL: NO PAIN (0)

## 2019-08-23 NOTE — PATIENT INSTRUCTIONS
Chief complaint:   Chief Complaint   Patient presents with   • Derm Problem     painful red raised bump under right breast x3days         Vitals:  Visit Vitals  /77 (BP Location: RUE, Patient Position: Sitting, Cuff Size: Large Adult)   Pulse 82   Temp 98.3 °F (36.8 °C) (Tympanic)   Ht 5' 5\" (1.651 m)   Wt 113.4 kg   LMP  (LMP Unknown)   BMI 41.60 kg/m²       HISTORY OF PRESENT ILLNESS     39-year-old female who presents to walk-in clinic today with a complaint of a painful red raised lesion under her right breast.  She first noticed it about 3 days ago.  She says the lesion is near her bra line.  She has not taken anything over-the-counter for the infection but has tried Tylenol for the pain.  No nausea or vomiting.  No documented fevers.      Other significant problems:  Patient Active Problem List    Diagnosis Date Noted   • Body mass index (BMI) 40.0-44.9, adult (CMS/[facility]) 11/15/2018     Priority: Low   • RAD (reactive airway disease)      Priority: Low     Moderate persistent     • Anxiety and depression      Priority: Low   • Allergy      Priority: Low   • GERD (gastroesophageal reflux disease)      Priority: Low   • Posttraumatic stress disorder      Priority: Low   • Eczema      Priority: Low   • Acute rhinosinusitis 03/11/2015     Priority: Low   • Other chronic allergic conjunctivitis 05/29/2013     Priority: Low   • Allergic rhinitis due to pollen 05/08/2013     Priority: Low   • Extrinsic asthma, unspecified 05/08/2013     Priority: Low       PAST MEDICAL, FAMILY AND SOCIAL HISTORY     Medications:  Current Outpatient Medications   Medication   • venlafaxine XR (EFFEXOR XR) 150 MG 24 hr capsule   • venlafaxine XR (EFFEXOR XR) 75 MG 24 hr capsule   • clonazePAM (KLONOPIN) 1 MG tablet   • methylPREDNISolone (MEDROL, NYDIA,) 4 MG tablet   • ondansetron (ZOFRAN ODT) 4 MG disintegrating tablet   • doxycycline hyclate (VIBRAMYCIN) 100 MG capsule   • albuterol (PROAIR HFA) 108 (90 Base) MCG/ACT inhaler   •  Try Afrin (oxymetazoline) 2 sprays each nostril up to twice daily for 3 days  Use saline rinse    Start amlodipine 5 mg daily    Referral for sleep study   ibuprofen (MOTRIN) 600 MG tablet   • pantoprazole (PROTONIX) 40 MG tablet   • hyoscyamine (LEVSIN SL) 0.125 MG sublingual tablet   • budesonide-formoterol (SYMBICORT) 160-4.5 MCG/ACT inhaler   • azelastine (OPTIVAR) 0.05 % ophthalmic solution   • cetirizine (ZYRTEC) 10 MG tablet   • montelukast (SINGULAIR) 10 MG tablet   • mometasone (ELOCON) 0.1 % cream   • ipratropium (ATROVENT) 0.03 % nasal spray   • azelastine (ASTELIN) 137 MCG/SPRAY nasal spray   • Spacer/Aero Chamber Mouthpiece MISC   • EPINEPHrine (EPIPEN 2-NYDIA) 0.3 MG/0.3ML injection   • Levalbuterol HCl (XOPENEX IN)   • amoxicillin (AMOXIL) 875 MG tablet   • azithromycin (ZITHROMAX) 250 MG tablet     No current facility-administered medications for this visit.        Allergies:  ALLERGIES:   Allergen Reactions   • Oxycodone-Acetaminophen SHORTNESS OF BREATH   • Cefprozil Other (See Comments)     Trouble breathing and swelling   • Dexilant SWELLING       Past Medical  History/Surgeries:  Past Medical History:   Diagnosis Date   • Allergy    • Anxiety    • Depressive disorder    • Eczema    • GERD (gastroesophageal reflux disease)    • Posttraumatic stress disorder    • RAD (reactive airway disease)     Moderate persistent   • Vitamin D deficiency        Past Surgical History:   Procedure Laterality Date   • Appendectomy  05/14/2016   • Bladder suspension  2011    Tension free vaginal tape w/AP repair (06/2011) & bladder sling revision 09/2011. Dr. Baker at [place]   • Cholecystectomy  12/01/2000   • Hysterectomy      Bilateral ovaries remain   • Tubal ligation         Family History:  Family History   Problem Relation Age of Onset   • Diabetes Father    • High blood pressure Father    • High cholesterol Father    • Asthma Sister    • Asthma Daughter    • Asthma Son    • Cancer Maternal Aunt         Breast   • Diabetes Maternal Uncle    • Diabetes Maternal Grandmother    • High blood pressure Maternal Grandmother    • High cholesterol Maternal Grandmother         Social History:  Social History     Tobacco Use   • Smoking status: Never Smoker   • Smokeless tobacco: Never Used   Substance Use Topics   • Alcohol use: No       REVIEW OF SYSTEMS     Review of Systems   Skin: Positive for wound.       PHYSICAL EXAM     Physical Exam   Constitutional: She is oriented to person, place, and time. She appears well-developed and well-nourished. No distress.   HENT:   Head: Normocephalic and atraumatic.   Eyes: Pupils are equal, round, and reactive to light. Conjunctivae and EOM are normal.   Neck: Normal range of motion. Neck supple.   Cardiovascular: Normal rate, regular rhythm and normal heart sounds. Exam reveals no gallop and no friction rub.   No murmur heard.  Pulmonary/Chest: Effort normal and breath sounds normal. No respiratory distress. She has no wheezes. She has no rales.   Neurological: She is alert and oriented to person, place, and time.   Skin: Skin is warm and dry. She is not diaphoretic. There is erythema.   Female nurse present in the room during exam.  Patient has abscess formation with cellulitis below the right breast along the bra line.  No fluctuance noted.  It is tender with increased warmth and swelling.   Psychiatric: She has a normal mood and affect. Her behavior is normal.   Nursing note and vitals reviewed.      ASSESSMENT/PLAN     Caitlin was seen today for derm problem.    Diagnoses and all orders for this visit:    Abscess of chest (CMS/[facility])  -     sulfamethoxazole-trimethoprim (BACTRIM DS,SEPTRA DS) 800-160 MG per tablet; Take 1 tablet by mouth 2 times daily.  Patient may take Tylenol alternating with Motrin every 4-6 hours when necessary for any pain or fever.  Home care instructions provided for review.      If symptoms worsen or fail to improve, patient should follow-up with their PCP.

## 2019-08-23 NOTE — NURSING NOTE
Pt presents to clinic today for medication management and sinus pressure x 2 days.      Medication Reconciliation: complete  Addie Key LPN

## 2019-08-23 NOTE — PROGRESS NOTES
Nursing Notes:   Addie Key LPN  8/23/2019  3:59 PM  Signed  Pt presents to clinic today for medication management and sinus pressure x 2 days.      Medication Reconciliation: complete  Addie Key LPN     SUBJECTIVE:  49 year old female presents for follow up on hypertension    Stopped blood pressure medication about 6 months ago. Started on CBD oil.   Was not checking home blood pressure until about 3 weeks ago.  Then felt dizzy, some headache, and felt anxious.  Home readings around 160-180/90. Realized she should be on medication.    Congested for a couple days. Not taking anything for it. Green rhinorrhea. No fever. Some facial discomfort, but bilateral.    Note Mallampati 4 on exam.  When asked about sleep apnea, she admits that family has told her that they have witnessed apneas and she is a loud snorer.  She does have some fatigue and daytime sleepiness.      REVIEW OF SYSTEMS:    Pertinent items are noted in HPI.    Current Outpatient Medications   Medication Sig Dispense Refill     lisinopril (PRINIVIL/ZESTRIL) 40 MG tablet Take 40 mg by mouth daily       metoprolol succinate (TOPROL-XL) 100 MG 24 hr tablet Take 1 tablet (100 mg) by mouth daily Needs to be seen in clinic prior to further refills for recheck (Patient not taking: Reported on 8/23/2019) 30 tablet 0     venlafaxine (EFFEXOR-XR) 75 MG 24 hr capsule TAKE 1 CAPSULE BY MOUTH EVERY DAY WITH A MEAL (Patient not taking: Reported on 8/23/2019) 30 capsule 0     Allergies   Allergen Reactions     Pollen Extract      Other reaction(s): Runny Nose  Itchy eyes       OBJECTIVE:  BP (!) 180/102   Pulse 102   Temp 98.2  F (36.8  C) (Tympanic)   Resp 18   Wt 71.9 kg (158 lb 9.6 oz)   SpO2 99%   BMI 29.01 kg/m      EXAM:  General Appearance: Pleasant, alert, appropriate appearance for age. No acute distress  Head: tender over bilateral frontal and maxillary sinuses  Nose: Clear rhinorrhea  OroPharynx Exam: Dental hygiene adequate. Normal buccal  mucosa. Normal pharynx. Mallampati 4/4.  Neck Exam: Supple, no masses or nodes.  Chest/Respiratory Exam: Normal chest wall and respirations. Clear to auscultation.  Cardiovascular Exam: Regular rate and rhythm. S1, S2, no murmur, click, gallop, or rubs.  Extremities: 2 + pedal pulses.  No lower extremity edema.  Psychiatric: Normal affect and mentation        ASSESSMENT/PLAN:    ICD-10-CM    1. Essential hypertension I10 amLODIPine (NORVASC) 5 MG tablet   2. Witnessed apneic spells R06.81 SLEEP EVALUATION & MANAGEMENT REFERRAL - [facility] [phone] (Age 13 and up)       Blood pressure is elevated.  She should be on some antihypertensive.  We discussed ACE inhibitor, diuretic, calcium channel blocker as first-line treatments.  Metoprolol may help, but does not show long-term benefit as the other medications do.  Start amlodipine 5 mg daily and track blood pressure at home.      Sleep apnea may be contributing to hypertension.  Recommend sleep study for further investigation.  She will check with insurance to see if she can have a home study as she is anxious about having a sleep study in the hospital.  May require some sedation in that case.    Acute sinusitis, likely viral.  Recommend trying Afrin twice daily for a few days.  Saline rinse could also help.  Decongestant would raise blood pressure further, so avoid oral decongestant.  Taking antihistamine may increase the congestion, but help with sleep.  She may consider this in conjunction with the Afrin.  If worse with unilateral pain, fever, or worsening then consider antibiotics.    F/U scheduled for physical in a few weeks    Greater than 50% of this 25 minute appointment spent on counseling   Karsten Holden MD    This document was prepared using a combination of typing and voice generated software.  While every attempt was made for accuracy, spelling and grammatical errors may exist.

## 2019-09-24 ENCOUNTER — APPOINTMENT (OUTPATIENT)
Dept: GENERAL RADIOLOGY | Facility: OTHER | Age: 49
End: 2019-09-24
Attending: PHYSICIAN ASSISTANT
Payer: COMMERCIAL

## 2019-09-24 ENCOUNTER — HOSPITAL ENCOUNTER (EMERGENCY)
Facility: OTHER | Age: 49
Discharge: HOME | End: 2019-09-24
Attending: PHYSICIAN ASSISTANT | Admitting: PHYSICIAN ASSISTANT
Payer: COMMERCIAL

## 2019-09-24 VITALS
BODY MASS INDEX: 28.34 KG/M2 | TEMPERATURE: 96.9 F | HEIGHT: 62 IN | DIASTOLIC BLOOD PRESSURE: 102 MMHG | OXYGEN SATURATION: 96 % | RESPIRATION RATE: 16 BRPM | SYSTOLIC BLOOD PRESSURE: 183 MMHG | WEIGHT: 154 LBS

## 2019-09-24 DIAGNOSIS — M79.671 RIGHT FOOT PAIN: ICD-10-CM

## 2019-09-24 DIAGNOSIS — L08.9 LOCAL INFECTION OF SKIN AND SUBCUTANEOUS TISSUE: ICD-10-CM

## 2019-09-24 PROCEDURE — 99283 EMERGENCY DEPT VISIT LOW MDM: CPT | Mod: Z6 | Performed by: PHYSICIAN ASSISTANT

## 2019-09-24 PROCEDURE — 73630 X-RAY EXAM OF FOOT: CPT | Mod: RT

## 2019-09-24 PROCEDURE — 99283 EMERGENCY DEPT VISIT LOW MDM: CPT | Performed by: PHYSICIAN ASSISTANT

## 2019-09-24 RX ORDER — CEPHALEXIN 500 MG/1
500 CAPSULE ORAL 4 TIMES DAILY
Qty: 28 CAPSULE | Refills: 0 | Status: SHIPPED | OUTPATIENT
Start: 2019-09-24 | End: 2020-03-05

## 2019-09-24 ASSESSMENT — MIFFLIN-ST. JEOR: SCORE: 1266.79

## 2019-09-24 ASSESSMENT — ENCOUNTER SYMPTOMS: WOUND: 1

## 2019-09-24 NOTE — ED AVS SNAPSHOT
[facility]  [address]  [place] [address]  Phone:  [phone]  Fax:  [phone]                                    Otis Frank   MRN: [id number]    Department:  [facility]   Date of Visit:  9/24/2019           After Visit Summary Signature Page    I have received my discharge instructions, and my questions have been answered. I have discussed any challenges I see with this plan with the nurse or doctor.    ..........................................................................................................................................  Patient/Patient Representative Signature      ..........................................................................................................................................  Patient Representative Print Name and Relationship to Patient    ..................................................               ................................................  Date                                   Time    ..........................................................................................................................................  Reviewed by Signature/Title    ...................................................              ..............................................  Date                                               Time          22EPIC Rev 08/18

## 2019-09-24 NOTE — ED PROVIDER NOTES
History     Chief Complaint   Patient presents with     Foot Pain     HPI  Otis Frank is a 49 year old female who presents to the ED with a chief complaint of foot pain.  Patient reports that 3 days prior she was taking a [employer] trailer off the back of her truck when it fell onto the top of her right foot.  She was wearing sandals at the time and there was a break in the skin.  She reports an increase in pain over the last few days however she is still able walking on her injured extremity.  She is otherwise healthy.    Allergies:  Allergies   Allergen Reactions     Pollen Extract      Other reaction(s): Runny Nose  Itchy eyes       Problem List:    Patient Active Problem List    Diagnosis Date Noted     Family history of malignant neoplasm of breast 01/22/2018     Priority: Medium     Overview:   Mother  Genetic counseling at [place] 2015 and determined low risk so did NOT have BRCA testing.       Dysthymic disorder 01/22/2018     Priority: Medium     Overview:   with anxiety features       FH: hemochromatosis 01/22/2018     Priority: Medium     Overview:   hereditary,C2 A2 Y and H63D mutation positive, requires yearly ferritin,   transfer and iron saturation, and LFTs       Macromastia 06/07/2017     Priority: Medium     White coat syndrome with diagnosis of hypertension 06/07/2017     Priority: Medium     Essential hypertension 12/13/2010     Priority: Medium        Past Medical History:    Past Medical History:   Diagnosis Date     Anxiety disorder      Essential (primary) hypertension      Personal history of other infectious and parasitic diseases        Past Surgical History:    Past Surgical History:   Procedure Laterality Date     ARTHROSCOPY KNEE      84, 88,Knee surgery x2     BIOPSY BREAST      , Breast bx - benign     MAMMOPLASTY REDUCTION BILATERAL Bilateral        Family History:    Family History   Problem Relation Age of Onset     Breast Cancer Mother 49         "Cancer-breast,metastatic breast cancer age 49;  age 50     Other - See Comments Father         Psychiatric illness,Persistent depression/hemachromatosis     Diabetes Father         Diabetes,type 2 diabetes     Hypertension Father         Hypertension     Other - See Comments Brother         Psychiatric illness,anxiety disorder     Hypertension Brother         Hypertension       Social History:  Marital Status:   [2]  Social History     Tobacco Use     Smoking status: Never Smoker     Smokeless tobacco: Never Used   Substance Use Topics     Alcohol use: Yes     Comment: Alcoholic Drinks/day:      Drug use: Not Currently     Comment: CBD oil        Medications:    amLODIPine (NORVASC) 5 MG tablet  cephALEXin (KEFLEX) 500 MG capsule          Review of Systems   Musculoskeletal:        Right foot pain   Skin: Positive for wound.       Physical Exam   BP: (!) 183/102  Heart Rate: 65  Temp: 96.9  F (36.1  C)  Resp: 16  Height: 157.5 cm (5' 2\")  Weight: 69.9 kg (154 lb)  SpO2: 96 %      Physical Exam  Constitutional:       General: She is not in acute distress.     Appearance: She is well-developed. She is not diaphoretic.   HENT:      Head: Normocephalic and atraumatic.   Eyes:      General: No scleral icterus.     Extraocular Movements: Extraocular movements intact.      Conjunctiva/sclera: Conjunctivae normal.      Pupils: Pupils are equal, round, and reactive to light.   Neck:      Musculoskeletal: Neck supple.   Cardiovascular:      Rate and Rhythm: Normal rate and regular rhythm.      Heart sounds: No murmur.   Pulmonary:      Effort: Pulmonary effort is normal.      Breath sounds: Normal breath sounds.   Abdominal:      Palpations: Abdomen is soft.      Tenderness: There is no tenderness.   Musculoskeletal: Normal range of motion.         General: Swelling and tenderness present. No deformity.   Lymphadenopathy:      Cervical: No cervical adenopathy.   Skin:     General: Skin is warm and dry.      " Findings: No rash.      Comments: Superficial gaping wound with slight erythema/swelling/drainage   Neurological:      General: No focal deficit present.      Mental Status: She is alert.   Psychiatric:         Mood and Affect: Mood normal.         Behavior: Behavior normal.         ED Course        Procedures               Critical Care time:  none               Results for orders placed or performed during the hospital encounter of 09/24/19 (from the past 24 hour(s))   XR Foot Right G/E 3 Views    Narrative    XR FOOT RT G/E 3 VW    HISTORY: 49 years Female foot pain.    COMPARISON: None    TECHNIQUE: Right foot 3 views    FINDINGS: There is dorsal soft tissue edema. There is no evidence of  fracture or dislocation the right foot.  There is first metatarsal phalangeal osteoarthritic change of mild  severity. No significant arthritic changes otherwise evident.      Impression    IMPRESSION: No evidence of fracture or dislocation.    Mild first metatarsal phalangeal osteoarthritic change    ALFONSO NEUMANN MD       Medications - No data to display    Assessments & Plan (with Medical Decision Making)   Patient is nontoxic-appearing in no acute distress.  She has slight tenderness to palpation in her meta tarsal regions right foot.  She also has a gaping wound in the same region with slight erythema and purulent drainage.  She has full range of motion of her ankle and toes with good circulation and sensation.    Patient taken for x-rays which were negative for acute fracture.    There is concern for possible infection.  We will start the patient on Keflex.  She is given crutches and tetanus is updated.    Strict return precautions are given, patient understands and agrees with the plan and patient is discharged.    Edinson Blackwell PA-C    I have reviewed the nursing notes.    I have reviewed the findings, diagnosis, plan and need for follow up with the patient.       New Prescriptions    CEPHALEXIN (KEFLEX) 500  MG CAPSULE    Take 1 capsule (500 mg) by mouth 4 times daily for 7 days       Final diagnoses:   Local infection of skin and subcutaneous tissue   Right foot pain       9/24/2019   [facility] \[facility]\""Edinson case PA  09/24/19 [id number]

## 2019-09-24 NOTE — ED TRIAGE NOTES
Patient had trailer fall on top of right foot on Saturday and now has throbbing in foot.  Patient tried to work today but unable to tolerate pain.   Wound is covered with Band-Aid.

## 2019-09-24 NOTE — DISCHARGE INSTRUCTIONS
Get plenty of fluids and rest.  You can take Tylenol and ibuprofen as needed for discomfort.  Take your antibiotic as directed.  Use her crutches to help with alleviating pain.  Also use rest, ice, compression, elevation.  If you have worsening infection such as increased redness, drainage, fevers recommend you follow-up for further management.

## 2019-10-19 DIAGNOSIS — I10 ESSENTIAL HYPERTENSION: ICD-10-CM

## 2019-10-22 RX ORDER — AMLODIPINE BESYLATE 5 MG/1
TABLET ORAL
Qty: 30 TABLET | Refills: 1 | Status: SHIPPED | OUTPATIENT
Start: 2019-10-22 | End: 2019-12-26

## 2019-10-22 NOTE — TELEPHONE ENCOUNTER
Prescription refilled per RN Medication Refill Policy..................Pauline Barney RN 10/22/2019 8:49 AM

## 2019-12-23 DIAGNOSIS — I10 ESSENTIAL HYPERTENSION: ICD-10-CM

## 2019-12-26 RX ORDER — AMLODIPINE BESYLATE 5 MG/1
TABLET ORAL
Qty: 30 TABLET | Refills: 1 | Status: SHIPPED | OUTPATIENT
Start: 2019-12-26 | End: 2020-03-05

## 2019-12-26 NOTE — TELEPHONE ENCOUNTER
Patient is due for physical. Patient is aware. Has had multiply cancelled appointments 09/09/19, 10/04/19, 10/22/19. PCP is out of clinic. Will route to teamlet for consideration.     Norvasc    Last Written Prescription Date: 10/22/2019  Last Fill Quantity: 30,   # refills: 1  Last Office Visit: 08/23/2019-with Dr. Holden. Start amlodipine 5 mg daily and track blood pressure at home. F/U scheduled for physical in a few weeks  BP Readings from Last 3 Encounters:   09/24/19 (!) 183/102   08/23/19 (!) 180/102   07/31/18 (!) 180/102     Future Office visit:       Routing refill request to provider for review/approval.     Unable to complete prescription refill per RNMedication Refill Policy.................... Peri Woods RN ....................  12/26/2019   12:16 PM

## 2020-03-03 ENCOUNTER — TELEPHONE (OUTPATIENT)
Dept: FAMILY MEDICINE | Facility: OTHER | Age: 50
End: 2020-03-03

## 2020-03-03 NOTE — TELEPHONE ENCOUNTER
Reason for call: Medication or medication refill    Name of medication requested: BP medicaiton pt did know     Are you out of the medication? 3 left     What pharmacy do you use? [facility]     Preferred method for responding to this message: Telephone Call    Phone number patient can be reached at: Cell number on file:    Telephone Information:   Mobile [phone]       If we cannot reach you directly, may we leave a detailed response at the number you provided? Yes

## 2020-03-05 ENCOUNTER — OFFICE VISIT (OUTPATIENT)
Dept: FAMILY MEDICINE | Facility: OTHER | Age: 50
End: 2020-03-05
Attending: FAMILY MEDICINE
Payer: COMMERCIAL

## 2020-03-05 VITALS
DIASTOLIC BLOOD PRESSURE: 98 MMHG | HEART RATE: 56 BPM | TEMPERATURE: 98.2 F | OXYGEN SATURATION: 96 % | RESPIRATION RATE: 16 BRPM | SYSTOLIC BLOOD PRESSURE: 150 MMHG

## 2020-03-05 DIAGNOSIS — I10 ESSENTIAL HYPERTENSION: Primary | ICD-10-CM

## 2020-03-05 DIAGNOSIS — F34.1 DYSTHYMIC DISORDER: ICD-10-CM

## 2020-03-05 DIAGNOSIS — Z12.11 SPECIAL SCREENING FOR MALIGNANT NEOPLASMS, COLON: ICD-10-CM

## 2020-03-05 PROCEDURE — 99214 OFFICE O/P EST MOD 30 MIN: CPT | Performed by: FAMILY MEDICINE

## 2020-03-05 RX ORDER — VENLAFAXINE HYDROCHLORIDE 37.5 MG/1
37.5 TABLET, EXTENDED RELEASE ORAL DAILY
Qty: 90 EACH | Refills: 3 | Status: SHIPPED | OUTPATIENT
Start: 2020-03-05 | End: 2021-03-15

## 2020-03-05 RX ORDER — AMLODIPINE BESYLATE 10 MG/1
10 TABLET ORAL DAILY
Qty: 90 TABLET | Refills: 3 | Status: SHIPPED | OUTPATIENT
Start: 2020-03-05 | End: 2021-02-23

## 2020-03-05 ASSESSMENT — PAIN SCALES - GENERAL: PAINLEVEL: NO PAIN (0)

## 2020-03-05 NOTE — NURSING NOTE
Patient here for medication refill. Patient declined doing depression and anxiety screening.  Medication Reconciliation: complete.    Zehra Parekh LPN  3/5/2020 4:08 PM

## 2020-03-09 PROBLEM — Z12.11 SPECIAL SCREENING FOR MALIGNANT NEOPLASMS, COLON: Status: ACTIVE | Noted: 2020-03-09

## 2020-03-09 NOTE — PROGRESS NOTES
SUBJECTIVE:   Otis DARDEN Jayshree Frank is a 49 year old female who presents to clinic today for the following health blood pressure issues: Discuss anxiety: Screening    Patient arrives here because of elevated blood pressure.  And discuss anxiety medications.  She has been having home readings.  Blood pressure 149/93 154/90 and 152/93.  This visit 150/98.  She is not in any pain or discomfort.  She is also been under a lot of anxiety.  Her  is in the process of trying to get a transplant for her liver his liver.  He does have a history of alcohol abuse.  He has end-stage liver disease.  Sounds like he is also having mental status changes.  Review of the chart shows she is not had a colonoscopy or colon screening declines a.  But she is willing to proceed with Cologuard.  I discussed indications.  There is no family history of colon cancer.  Not complaining of any lightheadedness.  She does complain of depression trouble with motivation.  Also sudden onset of anxiety shortness of breath.  She is having trouble with motivation.        Patient Active Problem List    Diagnosis Date Noted     Special screening for malignant neoplasms, colon 03/09/2020     Priority: Medium     Family history of malignant neoplasm of breast 01/22/2018     Priority: Medium     Overview:   Mother  Genetic counseling at [place] 2015 and determined low risk so did NOT have BRCA testing.       Dysthymic disorder 01/22/2018     Priority: Medium     Overview:   with anxiety features       FH: hemochromatosis 01/22/2018     Priority: Medium     Overview:   hereditary,C2 A2 Y and H63D mutation positive, requires yearly ferritin,   transfer and iron saturation, and LFTs       Macromastia 06/07/2017     Priority: Medium     White coat syndrome with diagnosis of hypertension 06/07/2017     Priority: Medium     Essential hypertension 12/13/2010     Priority: Medium     Past Medical History:   Diagnosis Date     Anxiety disorder     No Comments  Provided     Essential (primary) hypertension     No Comments Provided     Personal history of other infectious and parasitic diseases     No Comments Provided      Past Surgical History:   Procedure Laterality Date     ARTHROSCOPY KNEE      82, 88,Knee surgery x2     BIOPSY BREAST      , Breast bx - benign     MAMMOPLASTY REDUCTION BILATERAL Bilateral      Social History     Social History Narrative     Michi  Children:  Tonio, 1997 and Marni, 2001.  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[employer], ;    works as an  - he's not working much now due to illness (liver failure)-cirrhosis, awaiting transplant and having some neurologica    l issues like Parkinson's     Current Outpatient Medications   Medication Sig Dispense Refill     amLODIPine (NORVASC) 10 MG tablet Take 1 tablet (10 mg) by mouth daily 90 tablet 3     venlafaxine (EFFEXOR-ER) 37.5 MG 24 hr tablet Take 1 tablet (37.5 mg) by mouth daily 90 each 3     Allergies   Allergen Reactions     Pollen Extract      Other reaction(s): Runny Nose  Itchy eyes       Review of Systems     OBJECTIVE:     BP (!) 150/98   Pulse 56   Temp 98.2  F (36.8  C)   Resp 16   SpO2 96%   There is no height or weight on file to calculate BMI.  Physical Exam  Constitutional:       Appearance: Normal appearance.   HENT:      Head: Normocephalic.   Cardiovascular:      Rate and Rhythm: Regular rhythm. Tachycardia present.   Pulmonary:      Effort: Pulmonary effort is normal.      Breath sounds: Normal breath sounds.   Musculoskeletal: Normal range of motion.   Neurological:      General: No focal deficit present.      Mental Status: She is alert.   Psychiatric:         Mood and Affect: Mood normal.         Thought Content: Thought content normal.         Diagnostic Test Results:  No results found for any visits on 03/05/20.    ASSESSMENT/PLAN:         1. Essential hypertension  Currently not under good control.  Will increase the Norvasc to 10  -  amLODIPine (NORVASC) 10 MG tablet; Take 1 tablet (10 mg) by mouth daily  Dispense: 90 tablet; Refill: 3    2. Dysthymic disorder  Start  - venlafaxine (EFFEXOR-ER) 37.5 MG 24 hr tablet; Take 1 tablet (37.5 mg) by mouth daily  Dispense: 90 each; Refill: 3    3. Special screening for malignant neoplasms, colon  Proceed with Cologuard  - ABSTRACT COLOGUARD-NO CHARGE      Edward Rosenberg MD  [facility]

## 2020-03-19 ENCOUNTER — TELEPHONE (OUTPATIENT)
Dept: FAMILY MEDICINE | Facility: OTHER | Age: 50
End: 2020-03-19

## 2020-03-19 NOTE — TELEPHONE ENCOUNTER
Left message to call back.   Patient called to see if she would like to do a telephone visit on Wednesday due to the covid 19.  We could address her concerns with anxiety.   Helena Finn LPN ..........3/19/2020 11:08 AM

## 2020-03-23 NOTE — TELEPHONE ENCOUNTER
Patient informed of telephone option and patient would like to do that versus coming into the clinic.  Patient is available at cell number today or Wednesday-she is open to whenever Cheyenne Hinojosa MD is able to call her.      Theresa Sy LPN 3/23/2020 10:53 AM

## 2020-03-23 NOTE — TELEPHONE ENCOUNTER
Will wait to see what happens to the afternoon. If no additional patient, probably can do today. But would still like the nurse to pre-call and do PHQ9 and GAD7. Cheyenne Bacon MD

## 2021-02-20 DIAGNOSIS — I10 ESSENTIAL HYPERTENSION: ICD-10-CM

## 2021-02-23 RX ORDER — AMLODIPINE BESYLATE 10 MG/1
TABLET ORAL
Qty: 30 TABLET | Refills: 0 | Status: SHIPPED | OUTPATIENT
Start: 2021-02-23 | End: 2021-02-26

## 2021-02-23 NOTE — TELEPHONE ENCOUNTER
Patient remains due for annual visit and labs after notification. See 01/24/2021 letter. Patient unavailable phone. Message left for patient requesting a return call to remind her.   AmLODIPine (NORVASC) 10 MG tablet  Last Written Prescription Date: 03/05/2020  Last Fill Quantity: 90,   # refills: 3  Last Office Visit: 03/05/2020  Future Office visit:       Routing refill request to provider for review/approval because:  Failed - No Blood pressure under 140/90 in past 12 months   BP Readings from Last 3 Encounters:   03/05/20 (!) 150/98   09/24/19 (!) 183/102   08/23/19 (!) 180/102               Failed - No normal serum creatinine on file in past 12 months   Recent Labs   Lab Test 08/05/16  1058   CR 0.78

## 2021-02-23 NOTE — TELEPHONE ENCOUNTER
Patient notified. She declined to schedule. She states she has to look at a few things and will call back to schedule.     Juliette Smith LPN.................. 2/23/2021 1:53 PM

## 2021-02-23 NOTE — TELEPHONE ENCOUNTER
"Patient calls back and states, \" I will have to call back and schedule that. I do have it on my radar. Dr. Jono Rodríguez is so busy. Would she be willing to fill this until I can get into the clinic\"? Will route to PCP for consideration. Unable to complete prescription refill per RNMedication Refill Policy.................... Peri Woods RN ....................  2/23/2021   11:57 AM          "

## 2021-02-26 RX ORDER — VENLAFAXINE HYDROCHLORIDE 37.5 MG/1
37.5 TABLET, EXTENDED RELEASE ORAL DAILY
Qty: 90 TABLET | Refills: 3 | Status: CANCELLED | OUTPATIENT
Start: 2021-02-26

## 2021-03-02 ENCOUNTER — HOSPITAL ENCOUNTER (OUTPATIENT)
Dept: MAMMOGRAPHY | Facility: OTHER | Age: 51
Discharge: HOME | End: 2021-03-02
Attending: FAMILY MEDICINE | Admitting: FAMILY MEDICINE
Payer: COMMERCIAL

## 2021-03-02 DIAGNOSIS — Z12.31 VISIT FOR SCREENING MAMMOGRAM: ICD-10-CM

## 2021-03-02 PROCEDURE — 77063 BREAST TOMOSYNTHESIS BI: CPT

## 2021-03-03 ENCOUNTER — MYC MEDICAL ADVICE (OUTPATIENT)
Dept: FAMILY MEDICINE | Facility: OTHER | Age: 51
End: 2021-03-03

## 2021-03-06 ENCOUNTER — HEALTH MAINTENANCE LETTER (OUTPATIENT)
Age: 51
End: 2021-03-06

## 2021-03-15 DIAGNOSIS — F34.1 DYSTHYMIC DISORDER: ICD-10-CM

## 2021-03-16 RX ORDER — VENLAFAXINE HYDROCHLORIDE 37.5 MG/1
37.5 TABLET, EXTENDED RELEASE ORAL DAILY
Qty: 30 TABLET | Refills: 0 | Status: SHIPPED | OUTPATIENT
Start: 2021-03-16 | End: 2021-03-24

## 2021-03-16 NOTE — TELEPHONE ENCOUNTER
[facility] sent Rx request for the following:      Requested Prescriptions   Pending Prescriptions Disp Refills   venlafaxine (EFFEXOR-ER) 37.5 MG 24 hr tablet      Sig: Take 1 tablet (37.5 mg) by mouth daily   Last Prescription Date:   3/5/20  Last Fill Qty/Refills:         90, R-3    Last Office Visit:              3/5/20  Future Office visit:             Next 5 appointments (look out 90 days)    Mar 24, 2021  8:20 AM  MyChart Long with Jennifer Rodríguez MD  [facility] ([facility] ) [address]  [place] [id number] [phone]         Serotonin-Norepinephrine Reuptake Inhibitors  Failed - 3/16/2021  9:40 AM       Failed - Blood pressure under 140/90 in past 12 months    BP Readings from Last 3 Encounters:   03/05/20 (!) 150/98   09/24/19 (!) 183/102   08/23/19 (!) 180/102          Failed - PHQ-9 score of less than 5 in past 6 months       Failed - Normal serum creatinine on file in past 12 months     Noted upcoming appointment. Prescription approved per [facility] Refill Protocol for limited refill, to prevent break in medication. Cara Diaz RN .............. 3/16/2021  9:46 AM

## 2021-03-24 ENCOUNTER — OFFICE VISIT (OUTPATIENT)
Dept: FAMILY MEDICINE | Facility: OTHER | Age: 51
End: 2021-03-24
Attending: FAMILY MEDICINE
Payer: COMMERCIAL

## 2021-03-24 VITALS
DIASTOLIC BLOOD PRESSURE: 108 MMHG | OXYGEN SATURATION: 99 % | HEIGHT: 62 IN | SYSTOLIC BLOOD PRESSURE: 188 MMHG | HEART RATE: 92 BPM | BODY MASS INDEX: 30.18 KG/M2 | TEMPERATURE: 98 F | RESPIRATION RATE: 16 BRPM | WEIGHT: 164 LBS

## 2021-03-24 DIAGNOSIS — M79.9 SOFT TISSUE COMPLAINT: ICD-10-CM

## 2021-03-24 DIAGNOSIS — F34.1 DYSTHYMIC DISORDER: ICD-10-CM

## 2021-03-24 DIAGNOSIS — I10 ESSENTIAL HYPERTENSION: ICD-10-CM

## 2021-03-24 DIAGNOSIS — Z00.00 PHYSICAL EXAM, ANNUAL: Primary | ICD-10-CM

## 2021-03-24 LAB
ALBUMIN SERPL-MCNC: 4.8 G/DL (ref 3.5–5.7)
ALP SERPL-CCNC: 100 U/L (ref 34–104)
ALT SERPL W P-5'-P-CCNC: 22 U/L (ref 7–52)
ANION GAP SERPL CALCULATED.3IONS-SCNC: 10 MMOL/L (ref 3–14)
AST SERPL W P-5'-P-CCNC: 20 U/L (ref 13–39)
BILIRUB SERPL-MCNC: 0.3 MG/DL (ref 0.3–1)
BUN SERPL-MCNC: 14 MG/DL (ref 7–25)
CALCIUM SERPL-MCNC: 9.6 MG/DL (ref 8.6–10.3)
CHLORIDE SERPL-SCNC: 97 MMOL/L (ref 98–107)
CHOLEST SERPL-MCNC: 389 MG/DL
CO2 SERPL-SCNC: 27 MMOL/L (ref 21–31)
CREAT SERPL-MCNC: 0.75 MG/DL (ref 0.6–1.2)
GFR SERPL CREATININE-BSD FRML MDRD: 81 ML/MIN/{1.73_M2}
GLUCOSE SERPL-MCNC: 111 MG/DL (ref 70–105)
HDLC SERPL-MCNC: 112 MG/DL (ref 23–92)
LDLC SERPL CALC-MCNC: 265 MG/DL
NONHDLC SERPL-MCNC: 277 MG/DL
POTASSIUM SERPL-SCNC: 4.1 MMOL/L (ref 3.5–5.1)
PROT SERPL-MCNC: 8.3 G/DL (ref 6.4–8.9)
SODIUM SERPL-SCNC: 134 MMOL/L (ref 134–144)
TRIGL SERPL-MCNC: 61 MG/DL

## 2021-03-24 PROCEDURE — G0123 SCREEN CERV/VAG THIN LAYER: HCPCS | Performed by: FAMILY MEDICINE

## 2021-03-24 PROCEDURE — 99396 PREV VISIT EST AGE 40-64: CPT | Performed by: FAMILY MEDICINE

## 2021-03-24 PROCEDURE — 80061 LIPID PANEL: CPT | Mod: ZL | Performed by: FAMILY MEDICINE

## 2021-03-24 PROCEDURE — 88142 CYTOPATH C/V THIN LAYER: CPT | Performed by: FAMILY MEDICINE

## 2021-03-24 PROCEDURE — 80053 COMPREHEN METABOLIC PANEL: CPT | Mod: ZL | Performed by: FAMILY MEDICINE

## 2021-03-24 PROCEDURE — 87624 HPV HI-RISK TYP POOLED RSLT: CPT | Mod: ZL | Performed by: FAMILY MEDICINE

## 2021-03-24 PROCEDURE — 99213 OFFICE O/P EST LOW 20 MIN: CPT | Mod: 25 | Performed by: FAMILY MEDICINE

## 2021-03-24 PROCEDURE — 36415 COLL VENOUS BLD VENIPUNCTURE: CPT | Mod: ZL | Performed by: FAMILY MEDICINE

## 2021-03-24 RX ORDER — LISINOPRIL 20 MG/1
20 TABLET ORAL DAILY
Qty: 90 TABLET | Refills: 3 | Status: SHIPPED | OUTPATIENT
Start: 2021-03-24 | End: 2021-12-03

## 2021-03-24 RX ORDER — AMLODIPINE BESYLATE 10 MG/1
10 TABLET ORAL DAILY
Qty: 90 TABLET | Refills: 3 | Status: SHIPPED | OUTPATIENT
Start: 2021-03-24 | End: 2021-12-03

## 2021-03-24 RX ORDER — VENLAFAXINE HYDROCHLORIDE 37.5 MG/1
37.5 TABLET, EXTENDED RELEASE ORAL DAILY
Qty: 90 TABLET | Refills: 3 | Status: SHIPPED | OUTPATIENT
Start: 2021-03-24 | End: 2021-04-16

## 2021-03-24 RX ORDER — MULTIPLE VITAMINS W/ MINERALS TAB 9MG-400MCG
1 TAB ORAL DAILY
COMMUNITY
End: 2023-07-02

## 2021-03-24 SDOH — ECONOMIC STABILITY: FOOD INSECURITY: WITHIN THE PAST 12 MONTHS, THE FOOD YOU BOUGHT JUST DIDN'T LAST AND YOU DIDN'T HAVE MONEY TO GET MORE.: NEVER TRUE

## 2021-03-24 SDOH — SOCIAL STABILITY: SOCIAL INSECURITY
WITHIN THE LAST YEAR, HAVE TO BEEN RAPED OR FORCED TO HAVE ANY KIND OF SEXUAL ACTIVITY BY YOUR PARTNER OR EX-PARTNER?: NO

## 2021-03-24 SDOH — SOCIAL STABILITY: SOCIAL NETWORK: HOW OFTEN DO YOU GET TOGETHER WITH FRIENDS OR RELATIVES?: NOT ASKED

## 2021-03-24 SDOH — SOCIAL STABILITY: SOCIAL INSECURITY: WITHIN THE LAST YEAR, HAVE YOU BEEN AFRAID OF YOUR PARTNER OR EX-PARTNER?: NO

## 2021-03-24 SDOH — HEALTH STABILITY: PHYSICAL HEALTH: ON AVERAGE, HOW MANY MINUTES DO YOU ENGAGE IN EXERCISE AT THIS LEVEL?: NOT ASKED

## 2021-03-24 SDOH — HEALTH STABILITY: PHYSICAL HEALTH: ON AVERAGE, HOW MANY DAYS PER WEEK DO YOU ENGAGE IN MODERATE TO STRENUOUS EXERCISE (LIKE A BRISK WALK)?: 5 DAYS

## 2021-03-24 SDOH — SOCIAL STABILITY: SOCIAL INSECURITY: WITHIN THE LAST YEAR, HAVE YOU BEEN HUMILIATED OR EMOTIONALLY ABUSED IN OTHER WAYS BY YOUR PARTNER OR EX-PARTNER?: NO

## 2021-03-24 SDOH — SOCIAL STABILITY: SOCIAL NETWORK: IN A TYPICAL WEEK, HOW MANY TIMES DO YOU TALK ON THE PHONE WITH FAMILY, FRIENDS, OR NEIGHBORS?: NOT ASKED

## 2021-03-24 SDOH — ECONOMIC STABILITY: TRANSPORTATION INSECURITY
IN THE PAST 12 MONTHS, HAS LACK OF TRANSPORTATION KEPT YOU FROM MEETINGS, WORK, OR FROM GETTING THINGS NEEDED FOR DAILY LIVING?: NO

## 2021-03-24 SDOH — HEALTH STABILITY: MENTAL HEALTH
STRESS IS WHEN SOMEONE FEELS TENSE, NERVOUS, ANXIOUS, OR CAN'T SLEEP AT NIGHT BECAUSE THEIR MIND IS TROUBLED. HOW STRESSED ARE YOU?: RATHER MUCH

## 2021-03-24 SDOH — ECONOMIC STABILITY: FOOD INSECURITY: WITHIN THE PAST 12 MONTHS, YOU WORRIED THAT YOUR FOOD WOULD RUN OUT BEFORE YOU GOT MONEY TO BUY MORE.: NEVER TRUE

## 2021-03-24 SDOH — SOCIAL STABILITY: SOCIAL INSECURITY
WITHIN THE LAST YEAR, HAVE YOU BEEN KICKED, HIT, SLAPPED, OR OTHERWISE PHYSICALLY HURT BY YOUR PARTNER OR EX-PARTNER?: NO

## 2021-03-24 SDOH — SOCIAL STABILITY: SOCIAL NETWORK: HOW OFTEN DO YOU ATTEND CHURCH OR RELIGIOUS SERVICES?: NOT ASKED

## 2021-03-24 SDOH — SOCIAL STABILITY: SOCIAL NETWORK
DO YOU BELONG TO ANY CLUBS OR ORGANIZATIONS SUCH AS CHURCH GROUPS UNIONS, FRATERNAL OR ATHLETIC GROUPS, OR SCHOOL GROUPS?: YES

## 2021-03-24 SDOH — ECONOMIC STABILITY: INCOME INSECURITY: HOW HARD IS IT FOR YOU TO PAY FOR THE VERY BASICS LIKE FOOD, HOUSING, MEDICAL CARE, AND HEATING?: NOT HARD AT ALL

## 2021-03-24 SDOH — ECONOMIC STABILITY: TRANSPORTATION INSECURITY
IN THE PAST 12 MONTHS, HAS THE LACK OF TRANSPORTATION KEPT YOU FROM MEDICAL APPOINTMENTS OR FROM GETTING MEDICATIONS?: NO

## 2021-03-24 SDOH — SOCIAL STABILITY: SOCIAL NETWORK: HOW OFTEN DO YOU ATTENT MEETINGS OF THE CLUB OR ORGANIZATION YOU BELONG TO?: NOT ASKED

## 2021-03-24 SDOH — SOCIAL STABILITY: SOCIAL NETWORK: ARE YOU MARRIED, WIDOWED, DIVORCED, SEPARATED, NEVER MARRIED, OR LIVING WITH A PARTNER?: MARRIED

## 2021-03-24 ASSESSMENT — ANXIETY QUESTIONNAIRES
GAD7 TOTAL SCORE: 10
IF YOU CHECKED OFF ANY PROBLEMS ON THIS QUESTIONNAIRE, HOW DIFFICULT HAVE THESE PROBLEMS MADE IT FOR YOU TO DO YOUR WORK, TAKE CARE OF THINGS AT HOME, OR GET ALONG WITH OTHER PEOPLE: NOT DIFFICULT AT ALL
7. FEELING AFRAID AS IF SOMETHING AWFUL MIGHT HAPPEN: MORE THAN HALF THE DAYS
6. BECOMING EASILY ANNOYED OR IRRITABLE: NOT AT ALL
3. WORRYING TOO MUCH ABOUT DIFFERENT THINGS: MORE THAN HALF THE DAYS
5. BEING SO RESTLESS THAT IT IS HARD TO SIT STILL: NOT AT ALL
2. NOT BEING ABLE TO STOP OR CONTROL WORRYING: MORE THAN HALF THE DAYS
1. FEELING NERVOUS, ANXIOUS, OR ON EDGE: MORE THAN HALF THE DAYS

## 2021-03-24 ASSESSMENT — PAIN SCALES - GENERAL: PAINLEVEL: NO PAIN (0)

## 2021-03-24 ASSESSMENT — PATIENT HEALTH QUESTIONNAIRE - PHQ9
SUM OF ALL RESPONSES TO PHQ QUESTIONS 1-9: 0
5. POOR APPETITE OR OVEREATING: MORE THAN HALF THE DAYS

## 2021-03-24 ASSESSMENT — MIFFLIN-ST. JEOR: SCORE: 1294.21

## 2021-03-24 NOTE — PROGRESS NOTES
Nursing Notes:   Irene Avila LPN  3/24/2021  8:42 AM  Signed  Patient is here for medication check, high blood pressure, and a lump on the left side of her neck top of shoulder.     No LMP recorded (lmp unknown). Patient is postmenopausal.  Medication Reconciliation: complete    Irene Avila, LPN  3/24/2021 8:36 AM         SUBJECTIVE:   CC:  Otis Frank is a 51 year old female who presents to clinic today for the following health issues. Physical and  Medication follow-up and check of a lump on her left shoulder    HPI  Otis Frank is a 51 year old female who presents for medication follow-up and a check of a lump on her left shoulder.    Hypertension: She takes amlodipine 10 mg daily.  With checking this at home, it continues to run high, 150s.  This is in spite of very regular physical activity.  Positive family history of hypertension.  When her blood pressure is up, she will sometimes feel it in her head.  Denies chest pain or palpitations.    Chronic generalized anxiety disorder, she is on Effexor XR 37.5 mg daily.  She feels best when she is busy, working.  Also feels better with exercise.  Her  is on liver transplant list.  Her daughter decided to not start college and patient's brother and son help out too.      Colon cancer screening is due, Pap smear is due, last one done 2014.  Mammogram is up-to-date.    She has not seen lump on the top of her left shoulder.  She has had this seen by her chiropractor who thought it could be a lipoma.  It is not sore or tender.    Has cologuard test at home.       Allergies   Allergen Reactions     Pollen Extract      Other reaction(s): Runny Nose  Itchy eyes     Current Outpatient Medications   Medication     amLODIPine (NORVASC) 10 MG tablet     HEMP OIL OR EXTRACT OR OTHER CBD CANNABINOID, NOT MEDICAL CANNABIS,     lisinopril (ZESTRIL) 20 MG tablet     multivitamin w/minerals (MULTI-VITAMIN) tablet     venlafaxine  "(EFFEXOR-ER) 37.5 MG 24 hr tablet     No current facility-administered medications for this visit.       Past Medical History:   Diagnosis Date     Anxiety disorder     No Comments Provided     Essential (primary) hypertension     No Comments Provided     Personal history of other infectious and parasitic diseases     No Comments Provided      Past Surgical History:   Procedure Laterality Date     ARTHROSCOPY KNEE      84, 88,Knee surgery x2     BIOPSY BREAST      , Breast bx - benign     MAMMOPLASTY REDUCTION BILATERAL Bilateral        Review of Systems     PHQ-2 Score:     PHQ-2 ( 1999 Pfizer) 3/24/2021 8/23/2019   Q1: Little interest or pleasure in doing things 0 0   Q2: Feeling down, depressed or hopeless 0 0   PHQ-2 Score 0 0         PHQ-9 SCORE 8/5/2016 8/23/2019 3/24/2021   PHQ-9 Total Score 3 3 0     MARLIN-7 SCORE 3/31/2015 8/5/2016 3/24/2021   Total Score 4 6 10             OBJECTIVE:     BP (!) 188/108 (BP Location: Right arm, Patient Position: Sitting, Cuff Size: Adult Regular)   Pulse 92   Temp 98  F (36.7  C) (Tympanic)   Resp 16   Ht 1.562 m (5' 1.5\")   Wt 74.4 kg (164 lb)   LMP  (LMP Unknown)   SpO2 99%   Breastfeeding No   BMI 30.49 kg/m    Wt Readings from Last 3 Encounters:   03/24/21 74.4 kg (164 lb)   09/24/19 69.9 kg (154 lb)   08/23/19 71.9 kg (158 lb 9.6 oz)       Body mass index is 30.49 kg/m .  Physical Exam  Vitals signs and nursing note reviewed.   Constitutional:       Appearance: Normal appearance.   Cardiovascular:      Rate and Rhythm: Normal rate and regular rhythm.   Pulmonary:      Effort: Pulmonary effort is normal.      Breath sounds: Normal breath sounds.   Genitourinary:     General: Normal vulva.      Vagina: No vaginal discharge.      Comments: Pap smear obtained  Musculoskeletal:      Comments: 4cm area left upper back/shoulder      Neurological:      General: No focal deficit present.      Mental Status: She is alert.   Psychiatric:         Mood and Affect: " Mood normal.         Behavior: Behavior normal.            No results found for any visits on 03/24/21.      ASSESSMENT/PLAN:     1. Physical exam, annual    2. Dysthymic disorder    3. Essential hypertension    4. Soft tissue complaint        Assessment & Plan   Problem List Items Addressed This Visit        Circulatory    Essential hypertension    Relevant Medications    amLODIPine (NORVASC) 10 MG tablet    lisinopril (ZESTRIL) 20 MG tablet    Other Relevant Orders    Comprehensive metabolic panel    Lipid Profile       Behavioral    Dysthymic disorder    Relevant Medications    venlafaxine (EFFEXOR-ER) 37.5 MG 24 hr tablet      Other Visit Diagnoses     Physical exam, annual    -  Primary    Relevant Orders    HPV High Risk Types DNA Cervical    Pap Screen Thin Prep with HPV - recommended age 30 - 65 years (select HPV order below)    Soft tissue complaint        Relevant Orders    US Head Neck Soft Tissue          1.  Pap smear obtained  2.  BP not at goal -we will add lisinopril 20 mg daily to current dose of Norvasc.  I recommended that she take these medications at opposite times of the day.  Potential side effects discussed.  Discussed follow-up for labs and blood pressure recheck.  3.  Soft tissue ultrasound for area of neck recommended.  4.  She does have Cologuard testing kit at home to complete colon cancer screening.  5.  Labs due today, lipid and metabolic panel.  6.  Continue Effexor extended release 37.5 mg daily.  {Provider  Link to [facility] Help Grid :15      JOHN FLORES MD  [facility]    This note was created using voice recognition software and was screened for errors in transcription.

## 2021-03-25 DIAGNOSIS — E78.49 HYPERLIPIDEMIA, FAMILIAL, HIGH LDL: Primary | ICD-10-CM

## 2021-03-25 RX ORDER — ROSUVASTATIN CALCIUM 40 MG/1
40 TABLET, COATED ORAL DAILY
Qty: 90 TABLET | Refills: 3 | Status: SHIPPED | OUTPATIENT
Start: 2021-03-25 | End: 2021-12-03

## 2021-03-25 ASSESSMENT — ANXIETY QUESTIONNAIRES: GAD7 TOTAL SCORE: 10

## 2021-03-26 ENCOUNTER — HOSPITAL ENCOUNTER (OUTPATIENT)
Dept: ULTRASOUND IMAGING | Facility: OTHER | Age: 51
Discharge: HOME | End: 2021-03-26
Attending: FAMILY MEDICINE | Admitting: FAMILY MEDICINE
Payer: COMMERCIAL

## 2021-03-26 DIAGNOSIS — M79.9 SOFT TISSUE COMPLAINT: ICD-10-CM

## 2021-03-26 PROCEDURE — 76536 US EXAM OF HEAD AND NECK: CPT

## 2021-03-30 LAB
COPATH REPORT: NORMAL
PAP: NORMAL

## 2021-04-05 LAB
FINAL DIAGNOSIS: NORMAL
HPV HR 12 DNA CVX QL NAA+PROBE: NEGATIVE
HPV16 DNA SPEC QL NAA+PROBE: NEGATIVE
HPV18 DNA SPEC QL NAA+PROBE: NEGATIVE
SPECIMEN DESCRIPTION: NORMAL
SPECIMEN SOURCE CVX/VAG CYTO: NORMAL

## 2021-04-16 ENCOUNTER — TELEPHONE (OUTPATIENT)
Dept: FAMILY MEDICINE | Facility: OTHER | Age: 51
End: 2021-04-16

## 2021-04-16 RX ORDER — VENLAFAXINE HYDROCHLORIDE 37.5 MG/1
37.5 CAPSULE, EXTENDED RELEASE ORAL DAILY
COMMUNITY
End: 2021-07-13

## 2021-04-16 NOTE — TELEPHONE ENCOUNTER
[facility]-patient is looking for a medication refill of Venlafaxine [facility]    Please call and advise    Thank You    Emelia Trinh on 4/16/2021 at 12:28 PM

## 2021-04-16 NOTE — TELEPHONE ENCOUNTER
Notified patient that she has refills available at [facility]. Effexor was prescribed on 3/24/2021 with 3 refills.  Alee Santos LPN ....................  4/16/2021   1:00 PM

## 2021-04-16 NOTE — TELEPHONE ENCOUNTER
Fax received from [employer] stating patient's insurance does not cover Effexor tablets but will cover capsules so patient will be given capsules. Medication list updated.     Karen Sabillon CMA on 4/16/2021 at 4:33 PM

## 2021-04-26 ENCOUNTER — MYC MEDICAL ADVICE (OUTPATIENT)
Dept: FAMILY MEDICINE | Facility: OTHER | Age: 51
End: 2021-04-26

## 2021-04-26 DIAGNOSIS — Z12.11 SCREENING FOR COLON CANCER: Primary | ICD-10-CM

## 2021-04-26 DIAGNOSIS — R22.1 MASS OF NECK: ICD-10-CM

## 2021-04-26 NOTE — TELEPHONE ENCOUNTER
Cologuard order and referral to surgery pending.     Karen Sabillon, CMA on 4/26/2021 at 2:09 PM

## 2021-05-03 ENCOUNTER — OFFICE VISIT (OUTPATIENT)
Dept: SURGERY | Facility: OTHER | Age: 51
End: 2021-05-03
Attending: FAMILY MEDICINE
Payer: COMMERCIAL

## 2021-05-03 VITALS
BODY MASS INDEX: 30.49 KG/M2 | SYSTOLIC BLOOD PRESSURE: 162 MMHG | TEMPERATURE: 98.3 F | DIASTOLIC BLOOD PRESSURE: 88 MMHG | WEIGHT: 164 LBS | HEART RATE: 88 BPM

## 2021-05-03 DIAGNOSIS — R22.1 MASS OF NECK: ICD-10-CM

## 2021-05-03 DIAGNOSIS — D17.22 LIPOMA OF LEFT SHOULDER: Primary | ICD-10-CM

## 2021-05-03 PROCEDURE — 23071 EXC SHOULDER LES SC 3 CM/>: CPT | Mod: LT | Performed by: SURGERY

## 2021-05-03 PROCEDURE — 88304 TISSUE EXAM BY PATHOLOGIST: CPT

## 2021-05-03 ASSESSMENT — PAIN SCALES - GENERAL: PAINLEVEL: NO PAIN (0)

## 2021-05-03 NOTE — NURSING NOTE
Chief Complaint   Patient presents with     Minor Procedure     Neck Mass      Prior to the start of the procedure and with procedural staff participation, I verbally confirmed the patient s identity using two indicators, relevant allergies, that the procedure was appropriate and matched the consent or emergent situation, and that the correct equipment/implants were available. Immediately prior to starting the procedure I conducted the Time Out with the procedural staff and re-confirmed the patient s name, procedure, and site/side. (The Joint Commission universal protocol was followed.)  Yes    Sedation (Moderate or Deep): None    Medication Reconciliation: completed   Helena Morales LPN  5/3/2021 1:56 PM

## 2021-05-03 NOTE — PROGRESS NOTES
Procedure Note     Pre/Post Operative Diagnosis:   Left shoulder lipoma    Procedure:    Excision of left shoulder lipoma    Surgeon: BRIAN Rivera MD     Local Anesthesia: 1% lidocaine with0.25%Marcaine with epinephrine    Indication for the procedure:    This is a 51 year old female patient with left shoulder lipoma.  Patient notes a small, mobile mass under the left shoulder, right on the anterior portion of her trapezius.  Says he has tightness in her muscles here and can feel it when she moves her head.  No previous history of lipoma.  Clinically, this is a soft, mobile mass beneath the skin on the left neck/shoulder.  Suspect lipoma, ultrasound results indicate lipoma as well.  After explaining the risks to include bleeding, infection, recurrence or need for re-excision, and scarring the patient wished to proceed.    Procedure:   The area was prepped and draped in usual sterile fashion with ChloraPrep. After adequate local anesthesia, a 4 cm incision was made over the top of the palpable mass.  Dissection was carried down to the lipoma which is just posterior to the trapezius muscle.  The lipoma is dissected circumferentially and removed.  Fatty mass measures roughly 2.5 cm x 2 cm x 1 cm. the resultant cavity basically closed on itself.  The subcutaneous tissues were reapproximated with 3-0 Vicryl suture the skin was closed with running 4-0 Monocryl suture.  The incision was cleaned and skin glue was applied.    Plan:  The patient will be called with pathology results.  Patient will followup if there any problems with the wound including redness or drainage.      BRIAN Rivera MD

## 2021-07-11 DIAGNOSIS — F34.1 DYSTHYMIC DISORDER: Primary | ICD-10-CM

## 2021-07-13 RX ORDER — VENLAFAXINE HYDROCHLORIDE 37.5 MG/1
CAPSULE, EXTENDED RELEASE ORAL
Qty: 90 CAPSULE | Refills: 0 | Status: SHIPPED | OUTPATIENT
Start: 2021-07-13 | End: 2021-10-25

## 2021-07-13 NOTE — TELEPHONE ENCOUNTER
[facility]  sent Rx request for the following:     Requested Prescriptions   Pending Prescriptions Disp Refills     venlafaxine (EFFEXOR-XR) 37.5 MG 24 hr capsule [Pharmacy Med Name: VENLAFAXINE ER 37.5MG CAPSULES] 90 capsule      Sig: TAKE ONE CAPSULE BY MOUTH EVERY DAY       Last Prescription Date:   HISTORICAL  Last Fill Qty/Refills:          , R-     Last Office Visit:              3/24/2021  Future Office visit:           NONE    Routing refill request to provider for review/approval because:  Medication is reported/historical        Serotonin-Norepinephrine Reuptake Inhibitors  Failed - 7/11/2021  1:58 PM        Failed - Blood pressure under 140/90 in past 12 months     BP Readings from Last 3 Encounters:   05/03/21 (!) 162/88   03/24/21 (!) 188/108   03/05/20 (!) 150/98              Unable to complete prescription refill per RN Medication Refill Policy.................... Fatmata Zhu RN ....................  7/13/2021   9:05 AM

## 2021-10-09 ENCOUNTER — HEALTH MAINTENANCE LETTER (OUTPATIENT)
Age: 51
End: 2021-10-09

## 2021-10-22 DIAGNOSIS — F34.1 DYSTHYMIC DISORDER: ICD-10-CM

## 2021-10-25 ENCOUNTER — TELEPHONE (OUTPATIENT)
Dept: FAMILY MEDICINE | Facility: OTHER | Age: 51
End: 2021-10-25

## 2021-10-25 RX ORDER — VENLAFAXINE HYDROCHLORIDE 37.5 MG/1
CAPSULE, EXTENDED RELEASE ORAL
Qty: 30 CAPSULE | Refills: 0 | Status: SHIPPED | OUTPATIENT
Start: 2021-10-25 | End: 2021-11-22

## 2021-10-25 NOTE — TELEPHONE ENCOUNTER
Reminder letter sent to patient to schedule follow-up appt with PCP.Darius Bueno RN, BSN  ....................  10/25/2021   10:00 AM

## 2021-10-25 NOTE — TELEPHONE ENCOUNTER
Patient states pharmacy told her refill request was denied for her Venlafaxine. Writer informed patient a 30 day supply was sent. However, she will need to be seen prior to any further refills. Patient stated she would go on MyChart and schedule an appointment.     Karen Sabillon CMA on 10/25/2021 at 10:50 AM

## 2021-10-25 NOTE — TELEPHONE ENCOUNTER
Patient is wondering why her med was denied.   Please call back. Thank you   Zahraa Pickett on 10/25/2021 at 10:44 AM

## 2021-10-25 NOTE — TELEPHONE ENCOUNTER
"[facility] sent Rx request for the following:      VENLAFAXINE ER 37.5MG CAPSULES  Sig: TAKE 1 CAPSULE BY MOUTH EVERY DAY      Last Prescription Date:   7/13/2021  Last Fill Qty/Refills:         90, R-0    Last Office Visit:              3/24/2021   Future Office visit:           none    Requested Prescriptions   Pending Prescriptions Disp Refills     venlafaxine (EFFEXOR-XR) 37.5 MG 24 hr capsule [Pharmacy Med Name: VENLAFAXINE ER 37.5MG CAPSULES] 90 capsule 0     Sig: TAKE 1 CAPSULE BY MOUTH EVERY DAY       Serotonin-Norepinephrine Reuptake Inhibitors  Failed - 10/22/2021  4:18 PM        Failed - Blood pressure under 140/90 in past 12 months     BP Readings from Last 3 Encounters:   05/03/21 (!) 162/88   03/24/21 (!) 188/108   03/05/20 (!) 150/98                 Failed - PHQ-9 score of less than 5 in past 6 months     Please review last PHQ-9 score.           Failed - Recent (6 mo) or future (30 days) visit within the authorizing provider's specialty     Patient had office visit in the last 6 months or has a visit in the next 30 days with authorizing provider or within the authorizing provider's specialty.  See \"Patient Info\" tab in inbasket, or \"Choose Columns\" in Meds & Orders section of the refill encounter.            Passed - Medication is active on med list        Passed - Patient is age 18 or older        Passed - No active pregnancy on record        Passed - Normal serum creatinine on file in past 12 months     Recent Labs   Lab Test 03/24/21  0916   CR 0.75       Ok to refill medication if creatinine is low          Passed - No positive pregnancy test in past 12 months           Unable to complete prescription refill per RN Medication Refill Policy.................... Darius Bueno RN ....................  10/25/2021   9:46 AM          "

## 2021-11-19 DIAGNOSIS — F34.1 DYSTHYMIC DISORDER: ICD-10-CM

## 2021-11-22 ENCOUNTER — TELEPHONE (OUTPATIENT)
Dept: FAMILY MEDICINE | Facility: OTHER | Age: 51
End: 2021-11-22
Payer: COMMERCIAL

## 2021-11-22 RX ORDER — VENLAFAXINE HYDROCHLORIDE 37.5 MG/1
CAPSULE, EXTENDED RELEASE ORAL
Qty: 15 CAPSULE | Refills: 0 | Status: SHIPPED | OUTPATIENT
Start: 2021-11-22 | End: 2021-12-03

## 2021-11-22 NOTE — TELEPHONE ENCOUNTER
Patient is out of her Effexor and [facility] is out till 11/29, pt does have an appmt for med review on 11/29, but is asking to get her enough to get her through till her apptmt. Please call    [facility] Shi on 11/22/2021 at 1:07 PM

## 2021-11-22 NOTE — TELEPHONE ENCOUNTER
[facility] sent Rx request for the following:      Requested Prescriptions   Pending Prescriptions Disp Refills     venlafaxine (EFFEXOR-XR) 37.5 MG 24 hr capsule [Pharmacy Med Name: VENLAFAXINE ER 37.5MG CAPSULES] 30 capsule 0     Sig: TAKE 1 CAPSULE BY MOUTH EVERY DAY       Serotonin-Norepinephrine Reuptake Inhibitors  Failed - 11/22/2021  1:04 PM        Failed - Blood pressure under 140/90 in past 12 months     BP Readings from Last 3 Encounters:   05/03/21 (!) 162/88   03/24/21 (!) 188/108   03/05/20 (!) 150/98                 Failed - PHQ-9 score of less than 5 in past 6 months     Please review last PHQ-9 score.             Last Prescription Date:   10/25/2021  Last Fill Qty/Refills:         30, R-0    Last Office Visit:              3/24/2021  Future Office visit:             Next 5 appointments (look out 90 days)    Nov 29, 2021  1:20 PM  SHORT with Jennifer Rodríguez MD  [facility] ([facility] ) [address]  [place] [address]  [phone]

## 2021-12-01 NOTE — PROGRESS NOTES
ASSESSMENT/PLAN:     1. Essential hypertension    2. Hyperlipidemia, familial, high LDL    3. Dysthymic disorder    4. Special screening for malignant neoplasms, colon    5. Eczema, unspecified type        Assessment & Plan  1.  Blood pressure is currently under adequate control.  She is consistent with taking her medications.  Side effects, constipation, are tolerable.  Continue with same doses of amlodipine and lisinopril.  2.  Hyperlipidemia improved.  Continue same dose of Crestor.  3.  Anxiety symptoms under improved control.  Some of this is due to her  having had successful liver transplant this fall.  Continue same dose of Effexor.  4.  Patient is past age 50, has not had colon cancer screening.  No family history of colon cancer/polyps.  She is an appropriate candidate for Cologuard testing.  Orders placed.  5.  Eczema behind right ear is likely contact irritant/allergy.  Recommend hydrocortisone cream twice daily.  She may need a higher potency steroid.  Encouraged her to consider change in hair care products/increased local skin protection.  Problem List Items Addressed This Visit        Circulatory    Essential hypertension - Primary    Relevant Medications    amLODIPine (NORVASC) 10 MG tablet    lisinopril (ZESTRIL) 20 MG tablet    Other Relevant Orders    Lipid Panel (Completed)    Comprehensive Metabolic Panel (Completed)       Behavioral    Dysthymic disorder    Relevant Medications    venlafaxine (EFFEXOR-XR) 37.5 MG 24 hr capsule       Other    Special screening for malignant neoplasms, colon    Relevant Orders    COLOGUARD(EXACT SCIENCES) (Completed)      Other Visit Diagnoses     Hyperlipidemia, familial, high LDL        Relevant Medications    rosuvastatin (CRESTOR) 40 MG tablet    Other Relevant Orders    Lipid Panel (Completed)    Comprehensive Metabolic Panel (Completed)    Eczema, unspecified type              Review of the result(s) of each unique test - I have personally reviewed  Pt seen in the ER earlier in the night for headache. Reports she continues have a headache. EMS gave 4mg zofran, 15mg toradol and 100mcg of fentanyl.   "the labs listed above.             PDMP Review     None            Ordering of each unique test  Prescription drug management     MEDICATIONS:  Continue current medications without change  SELF MONITORING:       - Please check blood pressure readings several times a month  Work on weight loss  Regular exercise        JOHN FLORES MD, FAAFP  [facility]      NURSING NOTES:  Nursing Notes:   Karen Sabillon MA  12/3/2021  3:55 PM  Signed  Chief Complaint   Patient presents with     Recheck Medication     Patient is here for recheck on medications     Initial /88 (BP Location: Right arm, Patient Position: Sitting, Cuff Size: Adult Regular)   Pulse 70   Temp 98.1  F (36.7  C) (Tympanic)   Resp 16   LMP  (LMP Unknown)   SpO2 98%   Breastfeeding No  Estimated body mass index is 30.49 kg/m  as calculated from the following:    Height as of 3/24/21: 1.562 m (5' 1.5\").    Weight as of 5/3/21: 74.4 kg (164 lb).  Medication Reconciliation: complete    Karen Sabillon MA     FOOD SECURITY SCREENING QUESTIONS  Hunger Vital Signs:  Within the past 12 months we worried whether our food would run out before we got money to buy more. Never  Within the past 12 months the food we bought just didn't last and we didn't have money to get more. Never     Karen Sabillon MA 12/3/2021 3:45 PM         SUBJECTIVE:    Otis Frank is a 51 year old female  who presents for the following health issues:  Medication follow up     HPI  Otis Frank is a 51 year old female presents for medication follow up.    Hypertension:  With checking her blood pressure at home, she's had normal BPs in the 120s.  She feels like her energy is better and she is more rested.    She does have some constipation as side effect of amlodipine - takes mirlax in her coffee.  No LE edema.      Anxiety:  On Effexor and uses CBD oil (once a day).   She is still a worry wart, but able to be more in the " moment.     had his liver transplant          Allergies   Allergen Reactions     Pollen Extract      Other reaction(s): Runny Nose  Itchy eyes     Current Outpatient Medications   Medication     amLODIPine (NORVASC) 10 MG tablet     lisinopril (ZESTRIL) 20 MG tablet     multivitamin w/minerals (MULTI-VITAMIN) tablet     rosuvastatin (CRESTOR) 40 MG tablet     venlafaxine (EFFEXOR-XR) 37.5 MG 24 hr capsule     HEMP OIL OR EXTRACT OR OTHER CBD CANNABINOID, NOT MEDICAL CANNABIS,     No current facility-administered medications for this visit.      Past Medical History:   Diagnosis Date     Anxiety disorder     No Comments Provided     Essential (primary) hypertension     No Comments Provided     Personal history of other infectious and parasitic diseases     No Comments Provided      Past Surgical History:   Procedure Laterality Date     ARTHROSCOPY KNEE      84, 88,Knee surgery x2     BIOPSY BREAST      , Breast bx - benign     MAMMOPLASTY REDUCTION BILATERAL Bilateral        Review of Systems patient states the last 2 times that she has dyed her hair she is gotten a rash on her ear    PHQ-2 Score:     PHQ-2 ( 1999 Pfizer) 3/24/2021 8/23/2019   Q1: Little interest or pleasure in doing things 0 0   Q2: Feeling down, depressed or hopeless 0 0   PHQ-2 Score 0 0   PHQ-2 Total Score (12-17 Years)- Positive if 3 or more points; Administer PHQ-A if positive 0 0         PHQ-9 SCORE 8/23/2019 3/24/2021 12/3/2021   PHQ-9 Total Score MyChart - - 1 (Minimal depression)   PHQ-9 Total Score 3 0 1     MARLIN-7 SCORE 8/5/2016 3/24/2021 12/3/2021   Total Score - - 3 (minimal anxiety)   Total Score 6 10 3           OBJECTIVE:     Objective  /88 (BP Location: Right arm, Patient Position: Sitting, Cuff Size: Adult Regular)   Pulse 70   Temp 98.1  F (36.7  C) (Tympanic)   Resp 16   LMP  (LMP Unknown)   SpO2 98%   Breastfeeding No  There is no height or weight on file to calculate BMI.    Wt Readings from Last 4  Encounters:   05/03/21 74.4 kg (164 lb)   03/24/21 74.4 kg (164 lb)   09/24/19 69.9 kg (154 lb)   08/23/19 71.9 kg (158 lb 9.6 oz)       Nursing notes and VS reviewed    Physical Exam   GENERAL: healthy, alert and no distress  HEENT: Dry, scaly erythema on the back of her right ear  RESP: lungs clear to auscultation - no rales, rhonchi or wheezes  CV: regular rate and rhythm, no murmur  ABDOMEN: soft, nontender, no hepatosplenomegaly, no masses and bowel sounds normal  MS: no gross musculoskeletal defects noted, no edema        Results for orders placed or performed in visit on 12/03/21   Comprehensive Metabolic Panel     Status: Normal   Result Value Ref Range    Sodium 134 134 - 144 mmol/L    Potassium 4.2 3.5 - 5.1 mmol/L    Chloride 100 98 - 107 mmol/L    Carbon Dioxide (CO2) 24 21 - 31 mmol/L    Anion Gap 10 3 - 14 mmol/L    Urea Nitrogen 16 7 - 25 mg/dL    Creatinine 0.72 0.60 - 1.20 mg/dL    Calcium 9.6 8.6 - 10.3 mg/dL    Glucose 103 70 - 105 mg/dL    Alkaline Phosphatase 67 34 - 104 U/L    AST 17 13 - 39 U/L    ALT 20 7 - 52 U/L    Protein Total 7.5 6.4 - 8.9 g/dL    Albumin 4.6 3.5 - 5.7 g/dL    Bilirubin Total 0.4 0.3 - 1.0 mg/dL    GFR Estimate >90 >60 mL/min/1.73m2   Lipid Panel     Status: Abnormal   Result Value Ref Range    Cholesterol 209 (H) <200 mg/dL    Triglycerides 136 <150 mg/dL    Direct Measure HDL 61 23 - 92 mg/dL    LDL Cholesterol Calculated 121 (H) <=100 mg/dL    Non HDL Cholesterol 148 (H) <130 mg/dL    Patient Fasting > 8hrs? No     Narrative    Cholesterol  Desirable:  <200 mg/dL    Triglycerides  Normal:  Less than 150 mg/dL  Borderline High:  150-199 mg/dL  High:  200-499 mg/dL  Very High:  Greater than or equal to 500 mg/dL    Direct Measure HDL  Female:  Greater than or equal to 50 mg/dL   Male:  Greater than or equal to 40 mg/dL    LDL Cholesterol  Desirable:  <100mg/dL  Above Desirable:  100-129 mg/dL   Borderline High:  130-159 mg/dL   High:  160-189 mg/dL   Very High:  >= 190  mg/dL    Non HDL Cholesterol  Desirable:  130 mg/dL  Above Desirable:  130-159 mg/dL  Borderline High:  160-189 mg/dL  High:  190-219 mg/dL  Very High:  Greater than or equal to 220 mg/dL                 Answers for HPI/ROS submitted by the patient on 12/3/2021  If you checked off any problems, how difficult have these problems made it for you to do your work, take care of things at home, or get along with other people?: Not difficult at all  PHQ9 TOTAL SCORE: 1  MARLIN 7 TOTAL SCORE: 3

## 2021-12-03 ENCOUNTER — OFFICE VISIT (OUTPATIENT)
Dept: FAMILY MEDICINE | Facility: OTHER | Age: 51
End: 2021-12-03
Attending: FAMILY MEDICINE
Payer: COMMERCIAL

## 2021-12-03 VITALS
SYSTOLIC BLOOD PRESSURE: 130 MMHG | OXYGEN SATURATION: 98 % | HEART RATE: 70 BPM | TEMPERATURE: 98.1 F | DIASTOLIC BLOOD PRESSURE: 88 MMHG | RESPIRATION RATE: 16 BRPM

## 2021-12-03 DIAGNOSIS — I10 ESSENTIAL HYPERTENSION: Primary | ICD-10-CM

## 2021-12-03 DIAGNOSIS — F34.1 DYSTHYMIC DISORDER: ICD-10-CM

## 2021-12-03 DIAGNOSIS — E78.49 HYPERLIPIDEMIA, FAMILIAL, HIGH LDL: ICD-10-CM

## 2021-12-03 DIAGNOSIS — L30.9 ECZEMA, UNSPECIFIED TYPE: ICD-10-CM

## 2021-12-03 DIAGNOSIS — Z12.11 SPECIAL SCREENING FOR MALIGNANT NEOPLASMS, COLON: ICD-10-CM

## 2021-12-03 LAB
ALBUMIN SERPL-MCNC: 4.6 G/DL (ref 3.5–5.7)
ALP SERPL-CCNC: 67 U/L (ref 34–104)
ALT SERPL W P-5'-P-CCNC: 20 U/L (ref 7–52)
ANION GAP SERPL CALCULATED.3IONS-SCNC: 10 MMOL/L (ref 3–14)
AST SERPL W P-5'-P-CCNC: 17 U/L (ref 13–39)
BILIRUB SERPL-MCNC: 0.4 MG/DL (ref 0.3–1)
BUN SERPL-MCNC: 16 MG/DL (ref 7–25)
CALCIUM SERPL-MCNC: 9.6 MG/DL (ref 8.6–10.3)
CHLORIDE BLD-SCNC: 100 MMOL/L (ref 98–107)
CHOLEST SERPL-MCNC: 209 MG/DL
CO2 SERPL-SCNC: 24 MMOL/L (ref 21–31)
CREAT SERPL-MCNC: 0.72 MG/DL (ref 0.6–1.2)
FASTING STATUS PATIENT QL REPORTED: NO
GFR SERPL CREATININE-BSD FRML MDRD: >90 ML/MIN/1.73M2
GLUCOSE BLD-MCNC: 103 MG/DL (ref 70–105)
HDLC SERPL-MCNC: 61 MG/DL (ref 23–92)
LDLC SERPL CALC-MCNC: 121 MG/DL
NONHDLC SERPL-MCNC: 148 MG/DL
POTASSIUM BLD-SCNC: 4.2 MMOL/L (ref 3.5–5.1)
PROT SERPL-MCNC: 7.5 G/DL (ref 6.4–8.9)
SODIUM SERPL-SCNC: 134 MMOL/L (ref 134–144)
TRIGL SERPL-MCNC: 136 MG/DL

## 2021-12-03 PROCEDURE — 99214 OFFICE O/P EST MOD 30 MIN: CPT | Performed by: FAMILY MEDICINE

## 2021-12-03 PROCEDURE — 80053 COMPREHEN METABOLIC PANEL: CPT | Mod: ZL | Performed by: FAMILY MEDICINE

## 2021-12-03 PROCEDURE — 36415 COLL VENOUS BLD VENIPUNCTURE: CPT | Mod: ZL | Performed by: FAMILY MEDICINE

## 2021-12-03 PROCEDURE — 80061 LIPID PANEL: CPT | Mod: ZL | Performed by: FAMILY MEDICINE

## 2021-12-03 RX ORDER — VENLAFAXINE HYDROCHLORIDE 37.5 MG/1
CAPSULE, EXTENDED RELEASE ORAL
Qty: 90 CAPSULE | Refills: 3 | Status: SHIPPED | OUTPATIENT
Start: 2021-12-03 | End: 2022-11-28

## 2021-12-03 RX ORDER — LISINOPRIL 20 MG/1
20 TABLET ORAL DAILY
Qty: 90 TABLET | Refills: 3 | Status: SHIPPED | OUTPATIENT
Start: 2021-12-03 | End: 2023-04-18

## 2021-12-03 RX ORDER — AMLODIPINE BESYLATE 10 MG/1
10 TABLET ORAL DAILY
Qty: 90 TABLET | Refills: 3 | Status: SHIPPED | OUTPATIENT
Start: 2021-12-03 | End: 2023-01-31

## 2021-12-03 RX ORDER — ROSUVASTATIN CALCIUM 40 MG/1
40 TABLET, COATED ORAL DAILY
Qty: 90 TABLET | Refills: 3 | Status: SHIPPED | OUTPATIENT
Start: 2021-12-03 | End: 2022-11-28

## 2021-12-03 ASSESSMENT — ANXIETY QUESTIONNAIRES
8. IF YOU CHECKED OFF ANY PROBLEMS, HOW DIFFICULT HAVE THESE MADE IT FOR YOU TO DO YOUR WORK, TAKE CARE OF THINGS AT HOME, OR GET ALONG WITH OTHER PEOPLE?: NOT DIFFICULT AT ALL
3. WORRYING TOO MUCH ABOUT DIFFERENT THINGS: SEVERAL DAYS
7. FEELING AFRAID AS IF SOMETHING AWFUL MIGHT HAPPEN: NOT AT ALL
GAD7 TOTAL SCORE: 3
4. TROUBLE RELAXING: NOT AT ALL
1. FEELING NERVOUS, ANXIOUS, OR ON EDGE: SEVERAL DAYS
5. BEING SO RESTLESS THAT IT IS HARD TO SIT STILL: NOT AT ALL
2. NOT BEING ABLE TO STOP OR CONTROL WORRYING: SEVERAL DAYS
GAD7 TOTAL SCORE: 3
7. FEELING AFRAID AS IF SOMETHING AWFUL MIGHT HAPPEN: NOT AT ALL
6. BECOMING EASILY ANNOYED OR IRRITABLE: NOT AT ALL
GAD7 TOTAL SCORE: 3

## 2021-12-03 ASSESSMENT — PAIN SCALES - GENERAL: PAINLEVEL: NO PAIN (0)

## 2021-12-03 ASSESSMENT — PATIENT HEALTH QUESTIONNAIRE - PHQ9
SUM OF ALL RESPONSES TO PHQ QUESTIONS 1-9: 1
10. IF YOU CHECKED OFF ANY PROBLEMS, HOW DIFFICULT HAVE THESE PROBLEMS MADE IT FOR YOU TO DO YOUR WORK, TAKE CARE OF THINGS AT HOME, OR GET ALONG WITH OTHER PEOPLE: NOT DIFFICULT AT ALL
SUM OF ALL RESPONSES TO PHQ QUESTIONS 1-9: 1

## 2021-12-03 NOTE — NURSING NOTE
"Chief Complaint   Patient presents with     Recheck Medication     Patient is here for recheck on medications     Initial /88 (BP Location: Right arm, Patient Position: Sitting, Cuff Size: Adult Regular)   Pulse 70   Temp 98.1  F (36.7  C) (Tympanic)   Resp 16   LMP  (LMP Unknown)   SpO2 98%   Breastfeeding No  Estimated body mass index is 30.49 kg/m  as calculated from the following:    Height as of 3/24/21: 1.562 m (5' 1.5\").    Weight as of 5/3/21: 74.4 kg (164 lb).  Medication Reconciliation: complete    Karen Sabillon MA     FOOD SECURITY SCREENING QUESTIONS  Hunger Vital Signs:  Within the past 12 months we worried whether our food would run out before we got money to buy more. Never  Within the past 12 months the food we bought just didn't last and we didn't have money to get more. Never     Karen Sabillon MA 12/3/2021 3:45 PM    "

## 2021-12-04 ASSESSMENT — ANXIETY QUESTIONNAIRES: GAD7 TOTAL SCORE: 3

## 2021-12-04 ASSESSMENT — PATIENT HEALTH QUESTIONNAIRE - PHQ9: SUM OF ALL RESPONSES TO PHQ QUESTIONS 1-9: 1

## 2022-03-24 ENCOUNTER — OFFICE VISIT (OUTPATIENT)
Dept: FAMILY MEDICINE | Facility: OTHER | Age: 52
End: 2022-03-24
Attending: PHYSICIAN ASSISTANT
Payer: COMMERCIAL

## 2022-03-24 VITALS
RESPIRATION RATE: 16 BRPM | WEIGHT: 162 LBS | BODY MASS INDEX: 29.81 KG/M2 | TEMPERATURE: 98.5 F | OXYGEN SATURATION: 96 % | SYSTOLIC BLOOD PRESSURE: 128 MMHG | HEIGHT: 62 IN | DIASTOLIC BLOOD PRESSURE: 86 MMHG | HEART RATE: 73 BPM

## 2022-03-24 DIAGNOSIS — L23.81 ALLERGIC CONTACT DERMATITIS DUE TO ANIMAL DANDER: Primary | ICD-10-CM

## 2022-03-24 PROCEDURE — 99213 OFFICE O/P EST LOW 20 MIN: CPT | Performed by: PHYSICIAN ASSISTANT

## 2022-03-24 ASSESSMENT — PAIN SCALES - GENERAL: PAINLEVEL: NO PAIN (0)

## 2022-03-24 NOTE — PATIENT INSTRUCTIONS
Please refer to your AVS for follow up and pain/symptoms management recommendations (I.e.: medications, helpful conservative treatment modalities, appropriate follow up if need to a specialist or family practice, etc.). Please return to urgent care if your symptoms change or worsen.     Discharge instructions:  -If you were prescribed a medication(s), please take this as prescribed/directed  -Monitor your symptoms, if changing/worsening, return to UC/ER or PCP for follow up    Allergic/Contact Dermatitis   -Occurs by exposure to a substance or direct contact with an object that causes an allergic reaction. Nickel, certain medications and other plants (ie: poison ivy are common causes).   -Avoid the irritating agent and wash all objects/clothing that came in contact with the allergen.   -Treatment: antihistamines (Benadryl, Zyrtec, etc.) to alleviate the itching, topical corticosteroids (calamine or hydrocortisone cream) OR oral (prednisone).   -Cool compresses may help.   -Baking soda baths  -Recommend follow up with PCP, who may refer to dermatology if the rash does not get better with conservative treatments. If you notice fevers, chills, blistering, shortness of breath or chest pain, return to see your PCP or ER/UC immediately.   -Luke warm water baths - warmer water than this can dry out skin and cause further irritation

## 2022-03-24 NOTE — NURSING NOTE
"Patient presents to the clinic today with redness on her left arm that presented last night.  Patient states that it does not itch her.   Vanessa Douglas LPN 3/24/2022   1:09 PM    Chief Complaint   Patient presents with     Derm Problem       Initial /86 (BP Location: Right arm, Patient Position: Sitting, Cuff Size: Adult Regular)   Pulse 73   Temp 98.5  F (36.9  C) (Tympanic)   Resp 16   Ht 1.575 m (5' 2\")   Wt 73.5 kg (162 lb)   LMP  (LMP Unknown)   SpO2 96%   Breastfeeding No   BMI 29.63 kg/m   Estimated body mass index is 29.63 kg/m  as calculated from the following:    Height as of this encounter: 1.575 m (5' 2\").    Weight as of this encounter: 73.5 kg (162 lb).  Medication Reconciliation: complete  Vanessa Douglas LPN    "

## 2022-03-24 NOTE — PROGRESS NOTES
ASSESSMENT/PLAN:    I have reviewed the nursing notes.  I have reviewed the findings, diagnosis, plan and need for follow up with the patient.    1. Allergic contact dermatitis due to animal dander  - Vitals stable. PE consistent with allergic dermatitis due to cats. Airway patent.   Allergic/Contact Dermatitis   -Occurs by exposure to a substance or direct contact with an object that causes an allergic reaction. Nickel, certain medications and other plants (ie: poison ivy are common causes).   -Avoid the irritating agent and wash all objects/clothing that came in contact with the allergen.   -Treatment: antihistamines (Benadryl, Zyrtec, etc.) to alleviate the itching, topical corticosteroids (calamine or hydrocortisone cream) OR oral (prednisone).   -Cool compresses may help.   -Baking soda baths  -Recommend follow up with PCP, who may refer to dermatology if the rash does not get better with conservative treatments. If you notice fevers, chills, blistering, shortness of breath or chest pain, return to see your PCP or ER/UC immediately.   -Luke warm water baths - warmer water than this can dry out skin and cause further irritation    Discussed warning signs/symptoms indicative of need to f/u    Follow up if symptoms persist or worsen or concerns    I explained my diagnostic considerations and recommendations to the patient, who voiced understanding and agreement with the treatment plan. All questions were answered. We discussed potential side effects of any prescribed or recommended therapies, as well as expectations for response to treatments.    Marcelle Brito PA-C  3/24/2022  1:15 PM    HPI:    Otis DARDEN Jayshree Frank is a 52 year old female  who presents to [facility] today for concerns of rash, x left arm which began last night. (Left forearm and upper arm). She has some soreness associated with the rest. Rash is not pruritic in nature.     Rash has worsened since onset last night.     She did bring  Left detailed message per patient request. Per Esther PRINGLE:    If he can wait on the Hep B vaccine since he just got a flu shot that would be nice   He should reach out to his PCP who prescribes the naltrexone to find out if the dose can be decreased at all prior to surgery or if he should stop it in the post op period. He is right- it will have an effect on the narcotics post op. Other patients we have operated on medications like this usually have gotten guidance/recommendations from the provider prescribing it to them.   home new kittens - was prior allergic to cats when she was younger. She brought home the kittens x 2 days prior.     She has had a cat x 2 years. The kittens are longer hair/different bread.     Description:   Location: left upper arm and forearm  Character: red  Itching (Pruritis): no itching  Description: Spreading    Progression of Symptoms:  worsening    Accompanying Signs & Symptoms:  Fever: no  Body aches or joint pain: no  Sore throat symptoms: no  Recent cold symptoms: no    History:   Previous similar rash: no    Precipitating factors:   Similar rash in past: no  New exposures: pets   Recent travel: no  Exacerbating Factors: unsure    Alleviating factors: none    Therapies Tried and outcome: none    Exposure History: animals    Additional symptoms: none    Recent medication or other changes: none    PCP: MD STEFAN    Past Medical History:   Diagnosis Date     Anxiety disorder     No Comments Provided     Essential (primary) hypertension     No Comments Provided     Personal history of other infectious and parasitic diseases     No Comments Provided     Past Surgical History:   Procedure Laterality Date     ARTHROSCOPY KNEE      84, 90,Knee surgery x2     BIOPSY BREAST      , Breast bx - benign     MAMMOPLASTY REDUCTION BILATERAL Bilateral      Social History     Tobacco Use     Smoking status: Never Smoker     Smokeless tobacco: Never Used   Substance Use Topics     Alcohol use: Yes     Comment: Alcoholic Drinks/day: 1-2 times per week     Current Outpatient Medications   Medication Sig Dispense Refill     amLODIPine (NORVASC) 10 MG tablet Take 1 tablet (10 mg) by mouth daily 90 tablet 3     HEMP OIL OR EXTRACT OR OTHER CBD CANNABINOID, NOT MEDICAL CANNABIS,        lisinopril (ZESTRIL) 20 MG tablet Take 1 tablet (20 mg) by mouth daily 90 tablet 3     multivitamin w/minerals (MULTI-VITAMIN) tablet Take 1 tablet by mouth daily       rosuvastatin (CRESTOR) 40 MG tablet Take 1 tablet (40 mg) by mouth daily  "90 tablet 3     venlafaxine (EFFEXOR-XR) 37.5 MG 24 hr capsule TAKE 1 CAPSULE BY MOUTH EVERY DAY 90 capsule 3     Allergies   Allergen Reactions     Pollen Extract      Other reaction(s): Runny Nose  Itchy eyes     Past medical history, past surgical history, current medications and allergies reviewed and accurate to the best of my knowledge.      ROS:  Refer to HPI    /86 (BP Location: Right arm, Patient Position: Sitting, Cuff Size: Adult Regular)   Pulse 73   Temp 98.5  F (36.9  C) (Tympanic)   Resp 16   Ht 1.575 m (5' 2\")   Wt 73.5 kg (162 lb)   LMP  (LMP Unknown)   SpO2 96%   Breastfeeding No   BMI 29.63 kg/m      EXAM:  General Appearance: Well appearing 52 year old female, appropriate appearance for age. No acute distress   Respiratory: normal chest wall and respirations.  Normal effort.  Clear to auscultation bilaterally, no wheezing, crackles or rhonchi.  No increased work of breathing.  No cough appreciated.  Cardiac: RRR with no murmurs  Dermatological: erythematous, patchy, flat rash to left forearm and upper arm  Psychological: normal affect, alert, oriented, and pleasant.     Labs:  None     Xray:  None   "

## 2022-06-24 ENCOUNTER — OFFICE VISIT (OUTPATIENT)
Dept: FAMILY MEDICINE | Facility: OTHER | Age: 52
End: 2022-06-24
Attending: FAMILY MEDICINE
Payer: COMMERCIAL

## 2022-06-24 VITALS
HEART RATE: 82 BPM | OXYGEN SATURATION: 97 % | TEMPERATURE: 97.9 F | RESPIRATION RATE: 18 BRPM | SYSTOLIC BLOOD PRESSURE: 132 MMHG | DIASTOLIC BLOOD PRESSURE: 88 MMHG | WEIGHT: 152 LBS | BODY MASS INDEX: 27.8 KG/M2

## 2022-06-24 DIAGNOSIS — G89.29 CHRONIC PAIN OF LEFT KNEE: Primary | ICD-10-CM

## 2022-06-24 DIAGNOSIS — M25.562 CHRONIC PAIN OF LEFT KNEE: Primary | ICD-10-CM

## 2022-06-24 PROCEDURE — 99213 OFFICE O/P EST LOW 20 MIN: CPT | Performed by: FAMILY MEDICINE

## 2022-06-24 RX ORDER — ALPRAZOLAM 0.5 MG
TABLET ORAL
Qty: 2 TABLET | Refills: 0 | Status: SHIPPED | OUTPATIENT
Start: 2022-06-24 | End: 2022-07-28

## 2022-06-24 ASSESSMENT — ANXIETY QUESTIONNAIRES
2. NOT BEING ABLE TO STOP OR CONTROL WORRYING: NOT AT ALL
7. FEELING AFRAID AS IF SOMETHING AWFUL MIGHT HAPPEN: NOT AT ALL
GAD7 TOTAL SCORE: 1
7. FEELING AFRAID AS IF SOMETHING AWFUL MIGHT HAPPEN: NOT AT ALL
1. FEELING NERVOUS, ANXIOUS, OR ON EDGE: NOT AT ALL
GAD7 TOTAL SCORE: 1
8. IF YOU CHECKED OFF ANY PROBLEMS, HOW DIFFICULT HAVE THESE MADE IT FOR YOU TO DO YOUR WORK, TAKE CARE OF THINGS AT HOME, OR GET ALONG WITH OTHER PEOPLE?: NOT DIFFICULT AT ALL
6. BECOMING EASILY ANNOYED OR IRRITABLE: NOT AT ALL
4. TROUBLE RELAXING: NOT AT ALL
5. BEING SO RESTLESS THAT IT IS HARD TO SIT STILL: NOT AT ALL
3. WORRYING TOO MUCH ABOUT DIFFERENT THINGS: SEVERAL DAYS
GAD7 TOTAL SCORE: 1

## 2022-06-24 ASSESSMENT — PATIENT HEALTH QUESTIONNAIRE - PHQ9
SUM OF ALL RESPONSES TO PHQ QUESTIONS 1-9: 0
SUM OF ALL RESPONSES TO PHQ QUESTIONS 1-9: 0
10. IF YOU CHECKED OFF ANY PROBLEMS, HOW DIFFICULT HAVE THESE PROBLEMS MADE IT FOR YOU TO DO YOUR WORK, TAKE CARE OF THINGS AT HOME, OR GET ALONG WITH OTHER PEOPLE: NOT DIFFICULT AT ALL

## 2022-06-24 ASSESSMENT — PAIN SCALES - GENERAL: PAINLEVEL: SEVERE PAIN (6)

## 2022-06-24 ASSESSMENT — ENCOUNTER SYMPTOMS
COUGH: 0
FEVER: 0
NERVOUS/ANXIOUS: 0
ARTHRALGIAS: 1

## 2022-06-24 NOTE — PROGRESS NOTES
Nursing Notes:   Karen Peters LPN  6/24/2022  9:49 AM  Sign at exiting of workspace  Chief Complaint   Patient presents with     Pain     Left knee      Here today with complaints of Left knee pain. States that this winter she walking through tall snow with her dogs and felt a twist and pull. Then about 3 weeks ago while getting into a truck she heard a loud pop. Some relief from tylenol, ibuprofen and a brace. Does have appt with Dr. Warren on 7/7.     Medication Reconciliation: complete    Karen ROMERO. KRISTOFER Peters      Subjective   Otis is a 52 year old, presenting for the following health issues:  Pain (Left knee )    Otis is here today for a complaint of left medial knee pain.  Over the winter, she had been walking in deep snow with her dogs.  She had twisted her knee.  It was a moderate pain at that time and did get better.  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  The only thing that bothered her was when she would bend it.  Again about 3 weeks ago, she was getting into a truck and heard a loud pop in her knee.  It hurt immediately when this happened and swelled.  She has tried resting since then and it continues to get worse.  She has tried tylenol and ibuprofen, which helps temporarily.  Has been icing as well.  She has also been using a knee brace.  She has an appointment with Dr. Brett Warren on 7/7/22.  Her knee feels unstable like it will buckle on her.  Also has a sensation that it is locking.  Has had knee surgery on her right knee in the past.  She had a femur fracture and a tear in her meniscus.      Pain  Associated symptoms include arthralgias. Pertinent negatives include no coughing or fever.   History of Present Illness       Reason for visit:  Knee  Symptom onset:  3-4 weeks ago  Symptoms include:  Pain on inner knee  Symptom intensity:  Severe  Symptom progression:  Worsening  Had these symptoms before:  No  What makes it worse:  Bending  What makes it better:   Ibuprofen ice stabalizing    She eats 2-3 servings of fruits and vegetables daily.She consumes 0 sweetened beverage(s) daily.She exercises with enough effort to increase her heart rate 30 to 60 minutes per day.  She exercises with enough effort to increase her heart rate 6 days per week.   She is taking medications regularly.    Today's PHQ-9         PHQ-9 Total Score: 0    PHQ-9 Q9 Thoughts of better off dead/self-harm past 2 weeks :   Not at all    How difficult have these problems made it for you to do your work, take care of things at home, or get along with other people: Not difficult at all  Today's MARLIN-7 Score: 1       Pain History:  When did you first notice your pain? - Acute Pain   Have you seen anyone else for your pain? No  Where in your body do you have pain? Left knee       Review of Systems   Constitutional: Negative for fever.   Respiratory: Negative for cough.    Cardiovascular: Negative for peripheral edema.   Musculoskeletal: Positive for arthralgias.   Psychiatric/Behavioral: Negative for mood changes. The patient is not nervous/anxious.             Objective    /88 (BP Location: Right arm, Patient Position: Sitting, Cuff Size: Adult Regular)   Pulse 82   Temp 97.9  F (36.6  C) (Tympanic)   Resp 18   Wt 68.9 kg (152 lb)   LMP  (LMP Unknown)   SpO2 97%   Breastfeeding No   BMI 27.80 kg/m    Body mass index is 27.8 kg/m .  Physical Exam  Constitutional:       Appearance: Normal appearance.   HENT:      Head: Normocephalic.   Eyes:      Pupils: Pupils are equal, round, and reactive to light.   Musculoskeletal:      Comments: Left knee:  Small effusion.  No redness or warmth.  She has medial joint line tenderness.  Negative anterior/posterior drawer test.  Positive Karuna's.   Neurological:      Mental Status: She is alert.   Psychiatric:         Mood and Affect: Mood normal.         Behavior: Behavior normal.            MARLIN-7 SCORE 3/24/2021 12/3/2021 6/24/2022   Total Score - 3  (minimal anxiety) 1 (minimal anxiety)   Total Score 10 3 1       PHQ 3/24/2021 12/3/2021 6/24/2022   PHQ-9 Total Score 0 1 0   Q9: Thoughts of better off dead/self-harm past 2 weeks Not at all Not at all Not at all                   Assessment & Plan       ICD-10-CM    1. Chronic pain of left knee  M25.562 MR Knee Left w/o Contrast    G89.29 ALPRAZolam (XANAX) 0.5 MG tablet     1.  She has instability and a sense of locking and may have an underlying meniscus tear. Will pursue further imaging with MRI of her knee.  Prescription for xanax given to use prior to MRI for claustrophobia symptoms.  She has upcoming visit with Dr. Warren as well.  Consider Physical Therapy referral if no surgical findings on MRI.      Eryn Bullard MD  [facility] AND [facility]

## 2022-06-24 NOTE — NURSING NOTE
Chief Complaint   Patient presents with     Pain     Left knee      Here today with complaints of Left knee pain. States that this winter she walking through tall snow with her dogs and felt a twist and pull. Then about 3 weeks ago while getting into a truck she heard a loud pop. Some relief from tylenol, ibuprofen and a brace. Does have appt with Dr. Warren on 7/7.     Medication Reconciliation: complete    Karen Peters LPN

## 2022-06-29 ENCOUNTER — HOSPITAL ENCOUNTER (OUTPATIENT)
Dept: MRI IMAGING | Facility: OTHER | Age: 52
Discharge: HOME | End: 2022-06-29
Attending: FAMILY MEDICINE | Admitting: FAMILY MEDICINE
Payer: COMMERCIAL

## 2022-06-29 DIAGNOSIS — G89.29 CHRONIC PAIN OF LEFT KNEE: ICD-10-CM

## 2022-06-29 DIAGNOSIS — M25.562 CHRONIC PAIN OF LEFT KNEE: ICD-10-CM

## 2022-06-29 PROCEDURE — 73721 MRI JNT OF LWR EXTRE W/O DYE: CPT | Mod: LT

## 2022-06-30 DIAGNOSIS — M25.562 CHRONIC PAIN OF LEFT KNEE: Primary | ICD-10-CM

## 2022-06-30 DIAGNOSIS — G89.29 CHRONIC PAIN OF LEFT KNEE: Primary | ICD-10-CM

## 2022-07-07 ENCOUNTER — OFFICE VISIT (OUTPATIENT)
Dept: FAMILY MEDICINE | Facility: OTHER | Age: 52
End: 2022-07-07
Attending: FAMILY MEDICINE
Payer: COMMERCIAL

## 2022-07-07 VITALS
DIASTOLIC BLOOD PRESSURE: 84 MMHG | SYSTOLIC BLOOD PRESSURE: 134 MMHG | HEART RATE: 81 BPM | TEMPERATURE: 97.7 F | RESPIRATION RATE: 16 BRPM | BODY MASS INDEX: 28.17 KG/M2 | WEIGHT: 154 LBS | OXYGEN SATURATION: 98 %

## 2022-07-07 DIAGNOSIS — M17.12 PRIMARY OSTEOARTHRITIS OF LEFT KNEE: ICD-10-CM

## 2022-07-07 DIAGNOSIS — S72.492A: Primary | ICD-10-CM

## 2022-07-07 DIAGNOSIS — M23.204 DEGENERATIVE TEAR OF MEDIAL MENISCUS OF LEFT KNEE: ICD-10-CM

## 2022-07-07 PROCEDURE — 99215 OFFICE O/P EST HI 40 MIN: CPT | Performed by: FAMILY MEDICINE

## 2022-07-07 RX ORDER — DICLOFENAC SODIUM 75 MG/1
75 TABLET, DELAYED RELEASE ORAL 2 TIMES DAILY PRN
Qty: 60 TABLET | Refills: 1 | Status: SHIPPED | OUTPATIENT
Start: 2022-07-07 | End: 2023-07-02

## 2022-07-07 ASSESSMENT — PAIN SCALES - GENERAL: PAINLEVEL: SEVERE PAIN (6)

## 2022-07-07 NOTE — NURSING NOTE
"Chief Complaint   Patient presents with     Knee Injury     Left knee injury, DOI: 5/30/22     Patient presents for left knee injury/pain. DOI: 5/30/22. Injured from jumping off of dock and heard a \"pop\" and getting into your husbands truck a week later heard another \"pop\". She mentions walking her dogs in the deep snow this winter when she first started feeling pain in her left knee. Pain 5-6/10. She is weight bearing with a knee brace on at appointment today.     Initial /84 (BP Location: Right arm, Patient Position: Sitting, Cuff Size: Adult Regular)   Pulse 81   Temp 97.7  F (36.5  C) (Tympanic)   Resp 16   Wt 69.9 kg (154 lb)   LMP  (LMP Unknown)   SpO2 98%   BMI 28.17 kg/m   Estimated body mass index is 28.17 kg/m  as calculated from the following:    Height as of 3/24/22: 1.575 m (5' 2\").    Weight as of this encounter: 69.9 kg (154 lb).       Medication Reconciliation: Complete    Roxana Schuster LPN .......  7/7/2022  10:07 AM   "

## 2022-07-07 NOTE — PROGRESS NOTES
Sports Medicine Office Note    HPI:  52-year-old female coming in for evaluation of acute on chronic left knee pain.  Her knee pain began in the winter 2021-22.  She had made an appointment at that time but her pain improved and she canceled her appointment before being seen.  She had been dealing with relatively minimal pain about the left knee until 4 weeks ago when she suffered a twisting injury while getting into her truck and she heard a pop.  Since this time her pain has been quite significant.  She was seen in primary care clinic on 6/24.  An MRI was ordered.  She comes in today to follow-up on the results.  She rates her pain as 5-6/2010.  She characterizes the pain as aching and throbbing.  She localizes the pain to the medial aspect of the knee.  She has difficulty with bending, squatting, and going up/down stairs.  She has tried ice and and OTC brace.  She was previously taking OTC NSAIDs but has since backed off on these.  She does have occasional swelling.  She has a history of previous arthroscopic surgeries on the right knee.      EXAM:  /84 (BP Location: Right arm, Patient Position: Sitting, Cuff Size: Adult Regular)   Pulse 81   Temp 97.7  F (36.5  C) (Tympanic)   Resp 16   Wt 69.9 kg (154 lb)   LMP  (LMP Unknown)   SpO2 98%   BMI 28.17 kg/m    MUSCULOSKELETAL EXAM:  LEFT KNEE  Inspection:  -No gross deformity    Tenderness to palpation of the:  -Diffuse tenderness throughout the joint, most prominent at the medial joint line    Range of Motion:  -Passive flexion:  80  -Passive extension:  5-10    Strength:  -Extension:  4/5  -Flexion:  4/5    Special Tests:  -Effusion: Difficult to assess as she is not able to get into full extension  -Medial Karuna's: Unable to perform due to pain  -Lateral Karuna's: Unable to perform due to pain  -Valgus stress: Painful, difficult to assess due to guarding  -Varus stress: Painful, difficult to assess due to guarding  -Lachman test: Difficult to  assess due to guarding  -Anterior drawer: Difficult to assess due to guarding  -Posterior drawer:  Negative    Other:  -Intact sensation to light touch distally.  -No signs of cyanosis. Normal skin temperature of the lower extremity.  -Foot/ankle:  No gross deformity. Full range of motion.  -Right knee:  No gross deformity. No palpable tenderness. Normal strength and ROM.      IMAGIN2022: MRI left knee  - MCL, LCL, ACL, and PCL appear intact.  Potential irregularity in the posterior aspect of the medial meniscus.  Lateral meniscus appears intact.  Bony edema surrounding an osteochondral fracture involving the weightbearing portion of the medial femoral condyle.  Joint effusion present.  Extensor mechanism appears intact.      ASSESSMENT/PLAN:  Diagnoses and all orders for this visit:  Closed osteochondral fracture of distal end of left femur, initial encounter (H)  -     Orthopedic  Referral  -     diclofenac (VOLTAREN) 75 MG EC tablet; Take 1 tablet (75 mg) by mouth 2 times daily as needed for moderate pain With food  Primary osteoarthritis of left knee  -     diclofenac (VOLTAREN) 75 MG EC tablet; Take 1 tablet (75 mg) by mouth 2 times daily as needed for moderate pain With food  Degenerative tear of medial meniscus of left knee  -     diclofenac (VOLTAREN) 75 MG EC tablet; Take 1 tablet (75 mg) by mouth 2 times daily as needed for moderate pain With food    52-year-old female with a osteochondral fracture involving the weightbearing portion of the medial femoral condyle.  It is unclear if this fracture has been present for 7-8 months as this is when her pain initially began or if she had underlying arthritis pain during that time and the fracture developed with her most recent twisting injury approximately 4 weeks ago.  MRI from  was personally reviewed in the office with the findings as demonstrated above by my interpretation.  Typically for this injury there are nonsurgical and surgical  treatment options.  I do feel she would best benefit from having surgical consultation earlier to discuss options.  If the osteochondral fracture is able to be addressed by itself then she may benefit from the surgery.  If the only option at this point is TKA, we could maybe try nonsurgical interventions prior to moving forward with the surgery.  - Prescription written for medial offloading knee brace to [facility] acquired from [facility]  - Diclofenac 75 mg twice daily as needed  - Okay to use OTC APAP as needed  - Patient set up with appointment to see orthopedist at the time of the appointment  - For operative management, orthopedics to assume care  - Okay for patient to follow-up in this office for nonoperative management      Brett Shelton MD  7/7/2022  9:56 AM    Total time spent with this patient was 45 minutes which included chart review, visualization and independent interpretation of images, time spent with the patient, and documentation.    Procedure time:  0 minute(s)

## 2022-07-11 ENCOUNTER — TRANSFERRED RECORDS (OUTPATIENT)
Dept: HEALTH INFORMATION MANAGEMENT | Facility: OTHER | Age: 52
End: 2022-07-11

## 2022-07-28 ENCOUNTER — OFFICE VISIT (OUTPATIENT)
Dept: FAMILY MEDICINE | Facility: OTHER | Age: 52
End: 2022-07-28
Attending: FAMILY MEDICINE
Payer: COMMERCIAL

## 2022-07-28 VITALS
TEMPERATURE: 98 F | DIASTOLIC BLOOD PRESSURE: 88 MMHG | OXYGEN SATURATION: 98 % | SYSTOLIC BLOOD PRESSURE: 136 MMHG | BODY MASS INDEX: 29.26 KG/M2 | WEIGHT: 159 LBS | HEART RATE: 84 BPM | RESPIRATION RATE: 20 BRPM | HEIGHT: 62 IN

## 2022-07-28 DIAGNOSIS — E78.49 HYPERLIPIDEMIA, FAMILIAL, HIGH LDL: ICD-10-CM

## 2022-07-28 DIAGNOSIS — Z01.818 PREOP GENERAL PHYSICAL EXAM: Primary | ICD-10-CM

## 2022-07-28 DIAGNOSIS — F34.1 DYSTHYMIC DISORDER: ICD-10-CM

## 2022-07-28 DIAGNOSIS — M25.562 CHRONIC PAIN OF LEFT KNEE: ICD-10-CM

## 2022-07-28 DIAGNOSIS — I10 ESSENTIAL HYPERTENSION: ICD-10-CM

## 2022-07-28 DIAGNOSIS — G89.29 CHRONIC PAIN OF LEFT KNEE: ICD-10-CM

## 2022-07-28 LAB
ALBUMIN SERPL-MCNC: 4.7 G/DL (ref 3.5–5.7)
ALP SERPL-CCNC: 83 U/L (ref 34–104)
ALT SERPL W P-5'-P-CCNC: 28 U/L (ref 7–52)
ANION GAP SERPL CALCULATED.3IONS-SCNC: 11 MMOL/L (ref 3–14)
AST SERPL W P-5'-P-CCNC: 22 U/L (ref 13–39)
BASOPHILS # BLD AUTO: 0.1 10E3/UL (ref 0–0.2)
BASOPHILS NFR BLD AUTO: 1 %
BILIRUB SERPL-MCNC: 0.4 MG/DL (ref 0.3–1)
BUN SERPL-MCNC: 20 MG/DL (ref 7–25)
CALCIUM SERPL-MCNC: 9.6 MG/DL (ref 8.6–10.3)
CHLORIDE BLD-SCNC: 99 MMOL/L (ref 98–107)
CO2 SERPL-SCNC: 25 MMOL/L (ref 21–31)
CREAT SERPL-MCNC: 0.86 MG/DL (ref 0.6–1.2)
EOSINOPHIL # BLD AUTO: 0.3 10E3/UL (ref 0–0.7)
EOSINOPHIL NFR BLD AUTO: 4 %
ERYTHROCYTE [DISTWIDTH] IN BLOOD BY AUTOMATED COUNT: 13.5 % (ref 10–15)
GFR SERPL CREATININE-BSD FRML MDRD: 80 ML/MIN/1.73M2
GLUCOSE BLD-MCNC: 122 MG/DL (ref 70–105)
HCT VFR BLD AUTO: 37.3 % (ref 35–47)
HGB BLD-MCNC: 12.9 G/DL (ref 11.7–15.7)
IMM GRANULOCYTES # BLD: 0 10E3/UL
IMM GRANULOCYTES NFR BLD: 0 %
LYMPHOCYTES # BLD AUTO: 1.7 10E3/UL (ref 0.8–5.3)
LYMPHOCYTES NFR BLD AUTO: 21 %
MCH RBC QN AUTO: 32.1 PG (ref 26.5–33)
MCHC RBC AUTO-ENTMCNC: 34.6 G/DL (ref 31.5–36.5)
MCV RBC AUTO: 93 FL (ref 78–100)
MONOCYTES # BLD AUTO: 0.4 10E3/UL (ref 0–1.3)
MONOCYTES NFR BLD AUTO: 5 %
NEUTROPHILS # BLD AUTO: 5.7 10E3/UL (ref 1.6–8.3)
NEUTROPHILS NFR BLD AUTO: 69 %
NRBC # BLD AUTO: 0 10E3/UL
NRBC BLD AUTO-RTO: 0 /100
PLATELET # BLD AUTO: 392 10E3/UL (ref 150–450)
POTASSIUM BLD-SCNC: 3.8 MMOL/L (ref 3.5–5.1)
PROT SERPL-MCNC: 7.6 G/DL (ref 6.4–8.9)
RBC # BLD AUTO: 4.02 10E6/UL (ref 3.8–5.2)
SODIUM SERPL-SCNC: 135 MMOL/L (ref 134–144)
WBC # BLD AUTO: 8.2 10E3/UL (ref 4–11)

## 2022-07-28 PROCEDURE — 36415 COLL VENOUS BLD VENIPUNCTURE: CPT | Mod: ZL | Performed by: FAMILY MEDICINE

## 2022-07-28 PROCEDURE — U0005 INFEC AGEN DETEC AMPLI PROBE: HCPCS | Mod: ZL | Performed by: FAMILY MEDICINE

## 2022-07-28 PROCEDURE — C9803 HOPD COVID-19 SPEC COLLECT: HCPCS | Performed by: FAMILY MEDICINE

## 2022-07-28 PROCEDURE — 99213 OFFICE O/P EST LOW 20 MIN: CPT | Performed by: FAMILY MEDICINE

## 2022-07-28 PROCEDURE — 93000 ELECTROCARDIOGRAM COMPLETE: CPT | Performed by: INTERNAL MEDICINE

## 2022-07-28 PROCEDURE — 85018 HEMOGLOBIN: CPT | Mod: ZL | Performed by: FAMILY MEDICINE

## 2022-07-28 PROCEDURE — 80053 COMPREHEN METABOLIC PANEL: CPT | Mod: ZL | Performed by: FAMILY MEDICINE

## 2022-07-28 PROCEDURE — 85041 AUTOMATED RBC COUNT: CPT | Mod: ZL | Performed by: FAMILY MEDICINE

## 2022-07-28 ASSESSMENT — PAIN SCALES - GENERAL: PAINLEVEL: MODERATE PAIN (5)

## 2022-07-28 NOTE — PATIENT INSTRUCTIONS
Preparing for Your Surgery  Getting started  A nurse will call you to review your health history and instructions. They will give you an arrival time based on your scheduled surgery time. Please be ready to share:    Your doctor's clinic name and phone number    Your medical, surgical and anesthesia history    A list of allergies and sensitivities    A list of medicines, including herbal treatments and over-the-counter drugs    Whether the patient has a legal guardian (ask how to send us the papers in advance)  Please tell us if you're pregnant--or if there's any chance you might be pregnant. Some surgeries may injure a fetus (unborn baby), so they require a pregnancy test. Surgeries that are safe for a fetus don't always need a test, and you can choose whether to have one.   If you have a child who's having surgery, please ask for a copy of Preparing for Your Child's Surgery.    Preparing for surgery    Within 30 days of surgery: Have a pre-op exam (sometimes called an H&P, or History and Physical). This can be done at a clinic or pre-operative center.  ? If you're having a , you may not need this exam. Talk to your care team.    At your pre-op exam, talk to your care team about all medicines you take. If you need to stop any medicines before surgery, ask when to start taking them again.  ? We do this for your safety. Many medicines can make you bleed too much during surgery. Some change how well surgery (anesthesia) drugs work.    Call your insurance company to let them know you're having surgery. (If you don't have insurance, call [phone].)    Call your clinic if there's any change in your health. This includes signs of a cold or flu (sore throat, runny nose, cough, rash, fever). It also includes a scrape or scratch near the surgery site.    If you have questions on the day of surgery, call your hospital or surgery center.  COVID testing  You may need to be tested for COVID-19 before having  surgery. If so, we will give you instructions.  Eating and drinking guidelines  For your safety: Unless your surgeon tells you otherwise, follow the guidelines below.    Eat and drink as usual until 8 hours before surgery. After that, no food or milk.    Drink clear liquids until 2 hours before surgery. These are liquids you can see through, like water, Gatorade and Propel Water. You may also have black coffee and tea (no cream or milk).    Nothing by mouth within 2 hours of surgery. This includes gum, candy and breath mints.    If you drink alcohol: Stop drinking it the night before surgery.    If your care team tells you to take medicine on the morning of surgery, it's okay to take it with a sip of water.  Preventing infection    Shower or bathe the night before and morning of your surgery. Follow the instructions your clinic gave you. (If no instructions, use regular soap.)    Don't shave or clip hair near your surgery site. We'll remove the hair if needed.    Don't smoke or vape the morning of surgery. You may chew nicotine gum up to 2 hours before surgery. A nicotine patch is okay.  ? Note: Some surgeries require you to completely quit smoking and nicotine. Check with your surgeon.    Your care team will make every effort to keep you safe from infection. We will:  ? Clean our hands often with soap and water (or an alcohol-based hand rub).  ? Clean the skin at your surgery site with a special soap that kills germs.  ? Give you a special gown to keep you warm. (Cold raises the risk of infection.)  ? Wear special hair covers, masks, gowns and gloves during surgery.  ? Give antibiotic medicine, if prescribed. Not all surgeries need antibiotics.  What to bring on the day of surgery    Photo ID and insurance card    Copy of your health care directive, if you have one    Glasses and hearing aides (bring cases)  ? You can't wear contacts during surgery    Inhaler and eye drops, if you use them (tell us about these when  you arrive)    CPAP machine or breathing device, if you use them    A few personal items, if spending the night    If you have . . .  ? A pacemaker, ICD (cardiac defibrillator) or other implant: Bring the ID card.  ? An implanted stimulator: Bring the remote control.  ? A legal guardian: Bring a copy of the certified (court-stamped) guardianship papers.  Please remove any jewelry, including body piercings. Leave jewelry and other valuables at home.  If you're going home the day of surgery    You must have a responsible adult drive you home. They should stay with you overnight as well.    If you don't have someone to stay with you, and you aren't safe to go home alone, we may keep you overnight. Insurance often won't pay for this.  After surgery  If it's hard to control your pain or you need more pain medicine, please call your surgeon's office.  Questions?   If you have any questions for your care team, list them here: _________________________________________________________________________________________________________________________________________________________________________ ____________________________________ ____________________________________ ____________________________________  For informational purposes only. Not to replace the advice of your health care provider. Copyright   2003, 2019 [facility]. All rights reserved. Clinically reviewed by Yamileth Crane MD. [employer] [id number] - REV 07/21.

## 2022-07-28 NOTE — PROGRESS NOTES
[facility]  [address]  [place] [address]  Phone: [phone]  Fax: [phone]  Primary Provider: Jennifer Davis  Pre-op Performing Provider: LAMBERT HINOJOSA      PREOPERATIVE EVALUATION:  Today's date: 7/28/2022    Otis Frank is a 52 year old female who presents for a preoperative evaluation.    Surgical Information:  Surgery/Procedure: left knee  Surgery Location: [facility]  Surgeon: Dr. Romeo  Surgery Date: 08/02/22  Time of Surgery:    Where patient plans to recover: At home with family  Fax number for surgical facility:    Type of Anesthesia Anticipated: General    Assessment & Plan     The proposed surgical procedure is considered LOW risk.    Preop general physical exam  Chronic pain of left knee  Sports medicine records reviewed.  No orthopedic records for review.  CBC and CMP without significant abnormalities.  EKG sinus rhythm without evidence of ischemia.  She is low risk for a low risk procedure.  - CBC and Differential; Future  - EKG 12-lead, tracing only  - Comprehensive Metabolic Panel; Future    Essential hypertension  BP at goal.  She will hold Lisinopril the day of surgery and resume afterwards.  She will take Amlodipine the day of surgery.    Hyperlipidemia, familial, high LDL  Continue statin medication without adjustment.    Dysthymic disorder  Continue Venlafaxine without adjustment.    Possible Sleep Apnea: STOP BANG score 2, low risk RUSSELL     Risks and Recommendations:  The patient has the following additional risks and recommendations for perioperative complications:   - No identified additional risk factors other than previously addressed    Medication Instructions:   - ACE/ARB: HOLD due to exceptional risk of hypotension during surgery.    - Calcium Channel Blockers: May be continued on the day of surgery.   - Statins: Continue taking on the day of surgery.    - diclofenac (Voltaren): HOLD 1 day before surgery.    -  SSRIs, SNRIs, TCAs, Antipsychotics: Continue without modification.     RECOMMENDATION:  APPROVAL GIVEN to proceed with proposed procedure, without further diagnostic evaluation.      Subjective     HPI related to upcoming procedure:  Chronic left knee pain:  Symptoms initially began 1.5 years ago and then acutely worsened about 8 weeks ago when she suffered a twisting injury while getting into her truck.  She has had aching and throbbing pain over her medial knee and difficulty with bending, squatting, and going up and down stairs.  MRI revealed bony edema surrounding an osteochondral fracture involving weightbearing portion of the medial femoral condyle.  She will be undergoing left knee arthroscopy.    Preop Questions 7/28/2022   1. Have you ever had a heart attack or stroke? No   2. Have you ever had surgery on your heart or blood vessels, such as a stent placement, a coronary artery bypass, or surgery on an artery in your head, neck, heart, or legs? No   3. Do you have chest pain with activity? No   4. Do you have a history of  heart failure? No   5. Do you currently have a cold, bronchitis or symptoms of other infection? No   6. Do you have a cough, shortness of breath, or wheezing? No   7. Do you or anyone in your family have previous history of blood clots? No   8. Do you or does anyone in your family have a serious bleeding problem such as prolonged bleeding following surgeries or cuts? No   9. Have you ever had problems with anemia or been told to take iron pills? No   10. Have you had any abnormal blood loss such as black, tarry or bloody stools, or abnormal vaginal bleeding? No   11. Have you ever had a blood transfusion? No   12. Are you willing to have a blood transfusion if it is medically needed before, during, or after your surgery? Yes   13. Have you or any of your relatives ever had problems with anesthesia? No   14. Do you have sleep apnea, excessive snoring or daytime drowsiness? UNKNOWN -  Snoring, not louder than conversation or heard through closed doors   15. Do you have any artifical heart valves or other implanted medical devices like a pacemaker, defibrillator, or continuous glucose monitor? No   16. Do you have artificial joints? No   17. Are you allergic to latex? No   18. Is there any chance that you may be pregnant? No     Health Care Directive:  Patient does not have a Health Care Directive or Living Will: Discussed advance care planning with patient; however, patient declined at this time.    Preoperative Review of :   reviewed - no current prescriptions    Status of Chronic Conditions:  DEPRESSION - Patient has a long history of Depression of moderate severity requiring medication for control with recent symptoms being stable. Current symptoms of depression include none.     HYPERLIPIDEMIA - Patient has a long history of significant Hyperlipidemia requiring medication for treatment with recent fair control. Patient reports no problems or side effects with the medication.     HYPERTENSION - Patient has longstanding history of HTN , currently denies any symptoms referable to elevated blood pressure. Specifically denies chest pain, palpitations, dyspnea, orthopnea, PND or peripheral edema. Blood pressure readings have been in normal range. Current medication regimen is as listed below. Patient denies any side effects of medication.     Review of Systems  Constitutional, neuro, ENT, endocrine, pulmonary, cardiac, gastrointestinal, genitourinary, musculoskeletal, integument and psychiatric systems are negative, except as otherwise noted.    Patient Active Problem List    Diagnosis Date Noted     Special screening for malignant neoplasms, colon 03/09/2020     Priority: Medium     Family history of malignant neoplasm of breast 01/22/2018     Priority: Medium     Overview:   Mother  Genetic counseling at [place] 2015 and determined low risk so did NOT have BRCA testing.       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, requires yearly ferritin,   transfer and iron saturation, and LFTs       Macromastia 06/07/2017     Priority: Medium     White coat syndrome with diagnosis of hypertension 06/07/2017     Priority: Medium     Essential hypertension 12/13/2010     Priority: Medium      Past Medical History:   Diagnosis Date     Anxiety disorder     No Comments Provided     Essential (primary) hypertension     No Comments Provided     Personal history of other infectious and parasitic diseases     No Comments Provided     Past Surgical History:   Procedure Laterality Date     ARTHROSCOPY KNEE      84, 88,Knee surgery x2     BIOPSY BREAST      , Breast bx - benign     MAMMOPLASTY REDUCTION BILATERAL Bilateral      Current Outpatient Medications   Medication Sig Dispense Refill     ALPRAZolam (XANAX) 0.5 MG tablet Take 1 by mouth 30 minutes prior to MRI.  May repeat x 1 if needed. 2 tablet 0     amLODIPine (NORVASC) 10 MG tablet Take 1 tablet (10 mg) by mouth daily 90 tablet 3     diclofenac (VOLTAREN) 75 MG EC tablet Take 1 tablet (75 mg) by mouth 2 times daily as needed for moderate pain With food 60 tablet 1     HEMP OIL OR EXTRACT OR OTHER CBD CANNABINOID, NOT MEDICAL CANNABIS,        lisinopril (ZESTRIL) 20 MG tablet Take 1 tablet (20 mg) by mouth daily 90 tablet 3     multivitamin w/minerals (THERA-VIT-M) tablet Take 1 tablet by mouth daily       rosuvastatin (CRESTOR) 40 MG tablet Take 1 tablet (40 mg) by mouth daily 90 tablet 3     venlafaxine (EFFEXOR-XR) 37.5 MG 24 hr capsule TAKE 1 CAPSULE BY MOUTH EVERY DAY 90 capsule 3     Allergies   Allergen Reactions     Pollen Extract      Other reaction(s): Runny Nose  Itchy eyes      Social History     Tobacco Use     Smoking status: Never Smoker     Smokeless tobacco: Never Used  "  Substance Use Topics     Alcohol use: Yes     Comment: Alcoholic Drinks/day: 1-2 times per week     Family History   Problem Relation Age of Onset     Breast Cancer Mother 49        Cancer-breast,metastatic breast cancer age 49;  age 50     Anxiety Disorder Father      Diabetes Father         Diabetes,type 2 diabetes     Hypertension Father         Hypertension     Anxiety Disorder Brother         Psychiatric illness,anxiety disorder     Hypertension Brother      History   Drug Use Unknown         Objective     /88   Pulse 84   Temp 98  F (36.7  C)   Resp 20   Ht 1.575 m (5' 2\")   Wt 72.1 kg (159 lb)   LMP  (LMP Unknown)   SpO2 98%   BMI 29.08 kg/m      Physical Exam    GENERAL APPEARANCE: healthy, alert and no distress     EYES: EOMI, PERRL     HENT: ear canals and TM's normal and nose and mouth without ulcers or lesions     NECK: no adenopathy, no asymmetry, masses, or scars and thyroid normal to palpation     RESP: lungs clear to auscultation - no rales, rhonchi or wheezes     CV: regular rates and rhythm, normal S1 S2, no S3 or S4 and no murmur, click or rub     ABDOMEN:  soft, nontender, no HSM or masses and bowel sounds normal     MS: extremities normal- no gross deformities noted.     NEURO: Normal strength and tone,  mentation intact and speech normal     PSYCH: affect normal/bright     LYMPHATICS: No cervical adenopathy    Recent Labs   Lab Test 21  1614 21  0916    134   POTASSIUM 4.2 4.1   CR 0.72 0.75      Diagnostics:  Recent Results (from the past 168 hour(s))   Asymptomatic COVID-19 Virus (Coronavirus) by PCR Nose    Collection Time: 22  2:46 PM    Specimen: Nose; Swab   Result Value Ref Range    SARS CoV2 PCR Negative Negative, Testing sent to reference lab. Results will be returned via unsolicited result   Comprehensive Metabolic Panel    Collection Time: 22  3:04 PM   Result Value Ref Range    Sodium 135 134 - 144 mmol/L    Potassium 3.8 3.5 - 5.1 " mmol/L    Chloride 99 98 - 107 mmol/L    Carbon Dioxide (CO2) 25 21 - 31 mmol/L    Anion Gap 11 3 - 14 mmol/L    Urea Nitrogen 20 7 - 25 mg/dL    Creatinine 0.86 0.60 - 1.20 mg/dL    Calcium 9.6 8.6 - 10.3 mg/dL    Glucose 122 (H) 70 - 105 mg/dL    Alkaline Phosphatase 83 34 - 104 U/L    AST 22 13 - 39 U/L    ALT 28 7 - 52 U/L    Protein Total 7.6 6.4 - 8.9 g/dL    Albumin 4.7 3.5 - 5.7 g/dL    Bilirubin Total 0.4 0.3 - 1.0 mg/dL    GFR Estimate 80 >60 mL/min/1.73m2   CBC with platelets and differential    Collection Time: 07/28/22  3:04 PM   Result Value Ref Range    WBC Count 8.2 4.0 - 11.0 10e3/uL    RBC Count 4.02 3.80 - 5.20 10e6/uL    Hemoglobin 12.9 11.7 - 15.7 g/dL    Hematocrit 37.3 35.0 - 47.0 %    MCV 93 78 - 100 fL    MCH 32.1 26.5 - 33.0 pg    MCHC 34.6 31.5 - 36.5 g/dL    RDW 13.5 10.0 - 15.0 %    Platelet Count 392 150 - 450 10e3/uL    % Neutrophils 69 %    % Lymphocytes 21 %    % Monocytes 5 %    % Eosinophils 4 %    % Basophils 1 %    % Immature Granulocytes 0 %    NRBCs per 100 WBC 0 <1 /100    Absolute Neutrophils 5.7 1.6 - 8.3 10e3/uL    Absolute Lymphocytes 1.7 0.8 - 5.3 10e3/uL    Absolute Monocytes 0.4 0.0 - 1.3 10e3/uL    Absolute Eosinophils 0.3 0.0 - 0.7 10e3/uL    Absolute Basophils 0.1 0.0 - 0.2 10e3/uL    Absolute Immature Granulocytes 0.0 <=0.4 10e3/uL    Absolute NRBCs 0.0 10e3/uL      EKG: appears normal, NSR, normal axis, normal intervals, no acute ST/T changes c/w ischemia, no LVH by voltage criteria, there are no prior tracings available    Revised Cardiac Risk Index (RCRI):  The patient has the following serious cardiovascular risks for perioperative complications:   - No serious cardiac risks = 0 points     RCRI Interpretation: 0 points: Class I (very low risk - 0.4% complication rate)    Signed Electronically by: Patti Felix DO  Copy of this evaluation report is provided to requesting physician.

## 2022-07-28 NOTE — NURSING NOTE
Patient presents today for pre op exam.    Medication Reconciliation Complete    Madisyn Evangelista LPN  7/28/2022 2:03 PM

## 2022-07-29 LAB — SARS-COV-2 RNA RESP QL NAA+PROBE: NEGATIVE

## 2022-07-31 ENCOUNTER — MYC MEDICAL ADVICE (OUTPATIENT)
Dept: FAMILY MEDICINE | Facility: OTHER | Age: 52
End: 2022-07-31

## 2022-08-02 LAB
ATRIAL RATE - MUSE: 83 BPM
DIASTOLIC BLOOD PRESSURE - MUSE: NORMAL MMHG
INTERPRETATION ECG - MUSE: NORMAL
P AXIS - MUSE: 43 DEGREES
PR INTERVAL - MUSE: 130 MS
QRS DURATION - MUSE: 84 MS
QT - MUSE: 400 MS
QTC - MUSE: 470 MS
R AXIS - MUSE: 34 DEGREES
SYSTOLIC BLOOD PRESSURE - MUSE: NORMAL MMHG
T AXIS - MUSE: 51 DEGREES
VENTRICULAR RATE- MUSE: 83 BPM

## 2022-10-31 ENCOUNTER — OFFICE VISIT (OUTPATIENT)
Dept: FAMILY MEDICINE | Facility: OTHER | Age: 52
End: 2022-10-31
Attending: FAMILY MEDICINE
Payer: COMMERCIAL

## 2022-10-31 VITALS
DIASTOLIC BLOOD PRESSURE: 80 MMHG | HEART RATE: 84 BPM | TEMPERATURE: 97.6 F | RESPIRATION RATE: 16 BRPM | SYSTOLIC BLOOD PRESSURE: 132 MMHG | OXYGEN SATURATION: 98 %

## 2022-10-31 DIAGNOSIS — R21 RASH: Primary | ICD-10-CM

## 2022-10-31 PROCEDURE — 87205 SMEAR GRAM STAIN: CPT | Mod: ZL | Performed by: FAMILY MEDICINE

## 2022-10-31 PROCEDURE — 99213 OFFICE O/P EST LOW 20 MIN: CPT | Performed by: FAMILY MEDICINE

## 2022-10-31 PROCEDURE — 87077 CULTURE AEROBIC IDENTIFY: CPT | Mod: ZL | Performed by: FAMILY MEDICINE

## 2022-10-31 RX ORDER — HYDROXYZINE PAMOATE 25 MG/1
25 CAPSULE ORAL 3 TIMES DAILY PRN
Qty: 30 CAPSULE | Refills: 3 | Status: SHIPPED | OUTPATIENT
Start: 2022-10-31 | End: 2022-11-12

## 2022-10-31 RX ORDER — SULFAMETHOXAZOLE/TRIMETHOPRIM 800-160 MG
1 TABLET ORAL 2 TIMES DAILY
Qty: 20 TABLET | Refills: 0 | Status: SHIPPED | OUTPATIENT
Start: 2022-10-31 | End: 2022-11-10

## 2022-10-31 NOTE — PROGRESS NOTES
"  Assessment & Plan     (R21) Rash  (primary encounter diagnosis)  Comment: it is not clear what the trigger is.  Has some discharge form the nodules, so culture obtained and I suspect she has MRSA either as the initial cause or on top of the rash.  Can continue with T gel, and will add on vistaril for the itching.  Follow up in 2 weeks or so if not resolved.      Plan: Skin Aerobic Bacterial Culture Routine with         Gram Stain, sulfamethoxazole-trimethoprim         (BACTRIM DS) 800-160 MG tablet, hydrOXYzine         (VISTARIL) 25 MG capsule                        BMI:   Estimated body mass index is 29.08 kg/m  as calculated from the following:    Height as of 7/28/22: 1.575 m (5' 2\").    Weight as of 7/28/22: 72.1 kg (159 lb).           No follow-ups on file.    Marquis Graham MD  [facility]   Otis is a 52 year old, presenting for the following health issues:  Derm Problem (Arm, back, and scalp)      History of Present Illness       Reason for visit:  Rash  Symptom onset:  3-4 weeks ago  Symptoms include:  Rash on back neck ear and scalp  Symptom intensity:  Moderate  Symptom progression:  Worsening  Had these symptoms before:  Yes  Has tried/received treatment for these symptoms:  No  What makes it worse:  No  What makes it better:  T gel for scalp    She eats 2-3 servings of fruits and vegetables daily.She consumes 0 sweetened beverage(s) daily.She exercises with enough effort to increase her heart rate 30 to 60 minutes per day.  She exercises with enough effort to increase her heart rate 5 days per week.   She is taking medications regularly.     Started in her scalp, about 4 weeks ago.  Now itching into her arms, back and upper posterior neck.  Used T Gel, with some relief.  No meds or foods.  No new shampoos or soaps.  Getting discharge form the scalp lesions now.      Current Outpatient Medications   Medication     amLODIPine (NORVASC) 10 MG tablet     diclofenac " (VOLTAREN) 75 MG EC tablet     HEMP OIL OR EXTRACT OR OTHER CBD CANNABINOID, NOT MEDICAL CANNABIS,     lisinopril (ZESTRIL) 20 MG tablet     multivitamin w/minerals (THERA-VIT-M) tablet     rosuvastatin (CRESTOR) 40 MG tablet     venlafaxine (EFFEXOR-XR) 37.5 MG 24 hr capsule     No current facility-administered medications for this visit.             Review of Systems         Objective    /80   Pulse 84   Temp 97.6  F (36.4  C) (Tympanic)   Resp 16   LMP  (LMP Unknown)   SpO2 98%   There is no height or weight on file to calculate BMI.  Physical Exam  Constitutional:       Appearance: Normal appearance.   Skin:     Comments: Scalp has several raised red nodules with moderate clear discharge from several of them. Upper posterior back with red papules to nodules.  Arms have smaller red papules a red excoriations.     Neurological:      Mental Status: She is alert.   Psychiatric:         Mood and Affect: Mood normal.         Behavior: Behavior normal.

## 2022-10-31 NOTE — NURSING NOTE
"Chief Complaint   Patient presents with     Derm Problem     Arm, back, and scalp       Initial /80   Pulse 84   Temp 97.6  F (36.4  C) (Tympanic)   Resp 16   LMP  (LMP Unknown)   SpO2 98%  Estimated body mass index is 29.08 kg/m  as calculated from the following:    Height as of 7/28/22: 1.575 m (5' 2\").    Weight as of 7/28/22: 72.1 kg (159 lb).  Medication Reconciliation: complete    FOOD SECURITY SCREENING QUESTIONS  Hunger Vital Signs:  Within the past 12 months we worried whether our food would run out before we got money to buy more. Never  Within the past 12 months the food we bought just didn't last and we didn't have money to get more. Never  Jenn Yoo LPN 10/31/2022 11:29 AM        "

## 2022-11-02 ENCOUNTER — TELEPHONE (OUTPATIENT)
Dept: FAMILY MEDICINE | Facility: OTHER | Age: 52
End: 2022-11-02

## 2022-11-02 LAB
BACTERIA SKIN AEROBE CULT: ABNORMAL
GRAM STAIN RESULT: ABNORMAL
GRAM STAIN RESULT: ABNORMAL

## 2022-11-04 ENCOUNTER — MYC MEDICAL ADVICE (OUTPATIENT)
Dept: FAMILY MEDICINE | Facility: OTHER | Age: 52
End: 2022-11-04

## 2022-11-04 DIAGNOSIS — R21 RASH: Primary | ICD-10-CM

## 2022-11-04 NOTE — LETTER
November 4, 2022      Otis DARDEN Jayshree Frank  [address] [facility] [address]        To Whom It May Concern,       Ms. Frank is being treated for an illness that is contagious. She has missed several days form work as a result.  This letter is to excuse these days.      Sincerely,        Marquis Graham MD

## 2022-11-10 RX ORDER — MUPIROCIN 20 MG/G
OINTMENT TOPICAL 3 TIMES DAILY
Qty: 30 G | Refills: 3 | Status: SHIPPED | OUTPATIENT
Start: 2022-11-10 | End: 2023-07-02

## 2022-11-12 ENCOUNTER — OFFICE VISIT (OUTPATIENT)
Dept: FAMILY MEDICINE | Facility: OTHER | Age: 52
End: 2022-11-12
Attending: NURSE PRACTITIONER
Payer: COMMERCIAL

## 2022-11-12 VITALS
SYSTOLIC BLOOD PRESSURE: 130 MMHG | DIASTOLIC BLOOD PRESSURE: 80 MMHG | TEMPERATURE: 98.4 F | HEART RATE: 77 BPM | RESPIRATION RATE: 18 BRPM | OXYGEN SATURATION: 97 %

## 2022-11-12 DIAGNOSIS — R21 RASH: Primary | ICD-10-CM

## 2022-11-12 PROCEDURE — 96372 THER/PROPH/DIAG INJ SC/IM: CPT | Performed by: NURSE PRACTITIONER

## 2022-11-12 PROCEDURE — 250N000011 HC RX IP 250 OP 636: Performed by: NURSE PRACTITIONER

## 2022-11-12 PROCEDURE — 99203 OFFICE O/P NEW LOW 30 MIN: CPT | Performed by: NURSE PRACTITIONER

## 2022-11-12 RX ORDER — HYDROXYZINE PAMOATE 25 MG/1
CAPSULE ORAL
Qty: 90 CAPSULE | Refills: 0 | Status: SHIPPED | OUTPATIENT
Start: 2022-11-12 | End: 2023-07-02

## 2022-11-12 RX ORDER — TRIAMCINOLONE ACETONIDE 40 MG/ML
40 INJECTION, SUSPENSION INTRA-ARTICULAR; INTRAMUSCULAR ONCE
Status: COMPLETED | OUTPATIENT
Start: 2022-11-12 | End: 2022-11-12

## 2022-11-12 RX ORDER — PREDNISONE 20 MG/1
TABLET ORAL
Qty: 20 TABLET | Refills: 0 | Status: SHIPPED | OUTPATIENT
Start: 2022-11-12 | End: 2023-04-17

## 2022-11-12 RX ADMIN — TRIAMCINOLONE ACETONIDE 40 MG: 40 INJECTION, SUSPENSION INTRA-ARTICULAR; INTRAMUSCULAR at 12:20

## 2022-11-12 ASSESSMENT — PAIN SCALES - GENERAL: PAINLEVEL: NO PAIN (0)

## 2022-11-12 NOTE — PROGRESS NOTES
"HPI:    Otis Frank is a 52 year old female who presents to [facility] today for spreading rash.    She was initially seen by Dr. Graham on 10/31/22 for her rash on her skull and was treated with Bactrim DS. She sent in [employer] message yesterday regarding spreading rash and was called in some Mupirocin 2 % Topical 3 TIMES DAILY. She reports rash has worsened and she is \"going mad\" due to the itching.    ROS:  Refer to HPI    /80 (BP Location: Right arm, Patient Position: Sitting, Cuff Size: Adult Regular)   Pulse 77   Temp 98.4  F (36.9  C) (Temporal)   Resp 18   LMP  (LMP Unknown)   SpO2 97%     EXAM:  General Appearance: Well appearing female, appropriate appearance for age. No acute distress  Respiratory: normal chest wall and respirations.  Normal effort.  Clear to auscultation bilaterally, no wheezing, crackles or rhonchi.  No increased work of breathing.  No cough appreciated.  Cardiac: RRR with no murmurs  Musculoskeletal:  Equal movement of bilateral upper extremities.  Equal movement of bilateral lower extremities.  Normal gait.    Dermatological: see images  Psychological: normal affect, alert, oriented, and pleasant.                   Diagnostics:  No results found for any visits on 11/12/22.    ASSESSMENT/PLAN:  Otis was seen today for derm problem.    Diagnoses and all orders for this visit:    Rash, most likely allergic response to Bactrim DS.  -     predniSONE (DELTASONE) 20 MG tablet; Take 3 tabs by mouth daily x 3 days, then 2 tabs daily x 3 days, then 1 tab daily x 3 days, then 1/2 tab daily x 3 days.  Start this tomorrow morning.  -     triamcinolone (KENALOG-40) injection 40 mg given in clinic today.  -     hydrOXYzine (VISTARIL) 25 MG capsule; Take 25-50 mg three times daily as needed for itch/rash. Dose increase. Refill given.    May use over-the-counter Tylenol or ibuprofen PRN for discomfort, swelling, or fever.  May use OTC antihistamine such as Zyrtec, Allegra, or " Claritin or generic alternatives.  May use topical Benadryl cream.    Discussed warning signs/symptoms indicative of need to follow-up.    Follow up with PCP or ED if symptoms persist or worsen or concerns.    I explained my diagnostic considerations and recommendations to the patient, who voiced understanding and agreement with the treatment plan. All questions were answered. We discussed potential side effects of any prescribed or recommended therapies, as well as expectations for response to treatments.    KYLIE Davison, [place]RI[place]-ALEKS  [facility]  11/12/2022 12:56 PM    Total time spent with this patient was 30 minutes which included chart review, visualization and interpretation of labs/images, time spent with the patient and documentation.    Past Medical History:   Diagnosis Date     Anxiety disorder     No Comments Provided     Essential (primary) hypertension     No Comments Provided     Personal history of other infectious and parasitic diseases     No Comments Provided     Past Surgical History:   Procedure Laterality Date     ARTHROSCOPY KNEE      84, 90,Knee surgery x2     BIOPSY BREAST      , Breast bx - benign     MAMMOPLASTY REDUCTION BILATERAL Bilateral      Social History     Tobacco Use     Smoking status: Never     Smokeless tobacco: Never   Substance Use Topics     Alcohol use: Yes     Comment: Alcoholic Drinks/day: 1-2 times per week     Current Outpatient Medications   Medication Sig Dispense Refill     amLODIPine (NORVASC) 10 MG tablet Take 1 tablet (10 mg) by mouth daily 90 tablet 3     diclofenac (VOLTAREN) 75 MG EC tablet Take 1 tablet (75 mg) by mouth 2 times daily as needed for moderate pain With food 60 tablet 1     HEMP OIL OR EXTRACT OR OTHER CBD CANNABINOID, NOT MEDICAL CANNABIS,        hydrOXYzine (VISTARIL) 25 MG capsule Take 1 capsule (25 mg) by mouth 3 times daily as needed for itching 30 capsule 3     lisinopril (ZESTRIL) 20 MG tablet Take 1 tablet (20 mg) by mouth  "daily 90 tablet 3     multivitamin w/minerals (THERA-VIT-M) tablet Take 1 tablet by mouth daily       mupirocin (BACTROBAN) 2 % external ointment Apply topically 3 times daily 30 g 3     rosuvastatin (CRESTOR) 40 MG tablet Take 1 tablet (40 mg) by mouth daily 90 tablet 3     venlafaxine (EFFEXOR-XR) 37.5 MG 24 hr capsule TAKE 1 CAPSULE BY MOUTH EVERY DAY 90 capsule 3     Allergies   Allergen Reactions     Pollen Extract      Other reaction(s): Runny Nose  Itchy eyes       Past medical history, past surgical history, current medications and allergies reviewed and accurate to the best of my knowledge.      Nursing Notes:   Hayde Pimentel LPN  11/12/2022 11:47 AM  Signed  Chief Complaint   Patient presents with     Derm Problem     Rash on scalp, chest, arms, back  Originally was diagnosed w/staph on scalp 10/31/22       Initial /80 (BP Location: Right arm, Patient Position: Sitting, Cuff Size: Adult Regular)   Pulse 77   Temp 98.4  F (36.9  C) (Temporal)   Resp 18   LMP  (LMP Unknown)   SpO2 97%  Estimated body mass index is 29.08 kg/m  as calculated from the following:    Height as of 7/28/22: 1.575 m (5' 2\").    Weight as of 7/28/22: 72.1 kg (159 lb).  Medication Reconciliation: complete      FOOD SECURITY SCREENING QUESTIONS:    The next two questions are to help us understand your food security.  If you are feeling you need any assistance in this area, we have resources available to support you today.    Hunger Vital Signs:  Within the past 12 months we worried whether our food would run out before we got money to buy more. Never  Within the past 12 months the food we bought just didn't last and we didn't have money to get more. Never        Advance care plan reviewed      Hayde Pimentel LPN on 11/12/2022 at 11:46 AM        "

## 2022-11-27 DIAGNOSIS — F34.1 DYSTHYMIC DISORDER: ICD-10-CM

## 2022-11-27 DIAGNOSIS — E78.49 HYPERLIPIDEMIA, FAMILIAL, HIGH LDL: ICD-10-CM

## 2022-11-28 RX ORDER — VENLAFAXINE HYDROCHLORIDE 37.5 MG/1
CAPSULE, EXTENDED RELEASE ORAL
Qty: 90 CAPSULE | Refills: 0 | Status: SHIPPED | OUTPATIENT
Start: 2022-11-28 | End: 2023-02-27

## 2022-11-28 RX ORDER — ROSUVASTATIN CALCIUM 40 MG/1
TABLET, COATED ORAL
Qty: 90 TABLET | Refills: 0 | Status: SHIPPED | OUTPATIENT
Start: 2022-11-28 | End: 2023-03-08

## 2022-11-28 NOTE — TELEPHONE ENCOUNTER
[facility] sent Rx request for the following:      ROSUVASTATIN 40MG TABLETS      Last Prescription Date:   12/3/2021  Last Fill Qty/Refills:         90, R-3    Last Office Visit:              11/12/2022   Future Office visit:           none      VENLAFAXINE ER 37.5MG CAPSULES      Last Prescription Date:   12/3/2021  Last Fill Qty/Refills:         90, R-3      Darius Bueno RN, BSN  ....................  11/28/2022   10:27 AM

## 2023-01-30 DIAGNOSIS — I10 ESSENTIAL HYPERTENSION: ICD-10-CM

## 2023-01-31 RX ORDER — AMLODIPINE BESYLATE 10 MG/1
TABLET ORAL
Qty: 90 TABLET | Refills: 0 | Status: SHIPPED | OUTPATIENT
Start: 2023-01-31 | End: 2023-04-17

## 2023-02-01 DIAGNOSIS — I10 ESSENTIAL HYPERTENSION: ICD-10-CM

## 2023-02-01 RX ORDER — AMLODIPINE BESYLATE 10 MG/1
TABLET ORAL
Qty: 90 TABLET | Refills: 0 | OUTPATIENT
Start: 2023-02-01

## 2023-02-01 NOTE — TELEPHONE ENCOUNTER
[facility] #[id number]  sent Rx request for the following:      Requested Prescriptions   Pending Prescriptions Disp Refills     amLODIPine (NORVASC) 10 MG tablet [Pharmacy Med Name: AMLODIPINE BESYLATE 10MG TABLETS] 90 tablet 0     Sig: TAKE 1 TABLET(10 MG) BY MOUTH DAILY     Last Prescription Date:   01/31/23  Last Fill Qty/Refills:         90, R-0    Carmela Parekh RN on 2/1/2023 at 10:39 AM

## 2023-03-02 ENCOUNTER — OFFICE VISIT (OUTPATIENT)
Dept: FAMILY MEDICINE | Facility: OTHER | Age: 53
End: 2023-03-02
Attending: NURSE PRACTITIONER
Payer: COMMERCIAL

## 2023-03-02 VITALS
HEART RATE: 105 BPM | TEMPERATURE: 100.5 F | DIASTOLIC BLOOD PRESSURE: 88 MMHG | BODY MASS INDEX: 28.17 KG/M2 | SYSTOLIC BLOOD PRESSURE: 158 MMHG | OXYGEN SATURATION: 98 % | WEIGHT: 154 LBS | RESPIRATION RATE: 18 BRPM

## 2023-03-02 DIAGNOSIS — R50.9 LOW GRADE FEVER: ICD-10-CM

## 2023-03-02 DIAGNOSIS — J02.0 STREP PHARYNGITIS: Primary | ICD-10-CM

## 2023-03-02 DIAGNOSIS — R68.89 INFLUENZA-LIKE SYMPTOMS: ICD-10-CM

## 2023-03-02 DIAGNOSIS — J02.9 SORE THROAT: ICD-10-CM

## 2023-03-02 LAB
GROUP A STREP BY PCR: DETECTED
SARS-COV-2 RNA RESP QL NAA+PROBE: NEGATIVE

## 2023-03-02 PROCEDURE — U0003 INFECTIOUS AGENT DETECTION BY NUCLEIC ACID (DNA OR RNA); SEVERE ACUTE RESPIRATORY SYNDROME CORONAVIRUS 2 (SARS-COV-2) (CORONAVIRUS DISEASE [COVID-19]), AMPLIFIED PROBE TECHNIQUE, MAKING USE OF HIGH THROUGHPUT TECHNOLOGIES AS DESCRIBED BY CMS-2020-01-R: HCPCS | Mod: ZL | Performed by: NURSE PRACTITIONER

## 2023-03-02 PROCEDURE — C9803 HOPD COVID-19 SPEC COLLECT: HCPCS | Performed by: NURSE PRACTITIONER

## 2023-03-02 PROCEDURE — 99214 OFFICE O/P EST MOD 30 MIN: CPT | Mod: CS | Performed by: NURSE PRACTITIONER

## 2023-03-02 PROCEDURE — 87651 STREP A DNA AMP PROBE: CPT | Mod: ZL | Performed by: NURSE PRACTITIONER

## 2023-03-02 RX ORDER — PENICILLIN V POTASSIUM 500 MG/1
500 TABLET, FILM COATED ORAL 2 TIMES DAILY
Qty: 20 TABLET | Refills: 0 | Status: SHIPPED | OUTPATIENT
Start: 2023-03-02 | End: 2023-03-12

## 2023-03-02 ASSESSMENT — PAIN SCALES - GENERAL: PAINLEVEL: SEVERE PAIN (6)

## 2023-03-02 NOTE — NURSING NOTE
"Chief Complaint   Patient presents with     Pharyngitis     Nausea       FOOD SECURITY SCREENING QUESTIONS  Hunger Vital Signs:  Within the past 12 months we worried whether our food would run out before we got money to buy more. Never  Within the past 12 months the food we bought just didn't last and we didn't have money to get more. Never  Madhavi Vega LPN 3/2/2023 4:01 PM      Initial BP (!) 158/88 (BP Location: Right arm, Patient Position: Sitting, Cuff Size: Adult Regular)   Pulse 105   Temp (!) 100.5  F (38.1  C) (Tympanic)   Resp 18   Wt 69.9 kg (154 lb)   LMP  (LMP Unknown)   SpO2 98%   BMI 28.17 kg/m   Estimated body mass index is 28.17 kg/m  as calculated from the following:    Height as of 7/28/22: 1.575 m (5' 2\").    Weight as of this encounter: 69.9 kg (154 lb).  Medication Reconciliation: complete    Madhavi Vega LPN  "

## 2023-03-02 NOTE — PROGRESS NOTES
ASSESSMENT/PLAN:     I have reviewed the nursing notes.  I have reviewed the findings, diagnosis, plan and need for follow up with the patient.        1. Low grade fever    - Group A Streptococcus PCR Throat Swab  - Symptomatic COVID-19 Virus (Coronavirus) by PCR Nose    2. Influenza-like symptoms    - Group A Streptococcus PCR Throat Swab  - Symptomatic COVID-19 Virus (Coronavirus) by PCR Nose    Negative covid PCR test   Positive strep PCR test     3. Sore throat    - Group A Streptococcus PCR Throat Swab    4. Strep pharyngitis    - penicillin V (VEETID) 500 MG tablet; Take 1 tablet (500 mg) by mouth 2 times daily for 10 days  Dispense: 20 tablet; Refill: 0    Positive strep PCR test     New toothbrush in 2 days    Symptomatic treatment - Encouraged fluids, salt water gargles, honey, elevation, humidifier, saline nasal spray, sinus rinse/netti pot, lozenges, tea, soup, smoothies, popsicles, topical vapor rub, rest, etc     May use over-the-counter Tylenol or ibuprofen PRN    Discussed warning signs/symptoms indicative of need to f/u  Follow up if symptoms persist or worsen or concerns      I explained my diagnostic considerations and recommendations to the patient, who voiced understanding and agreement with the treatment plan. All questions were answered. We discussed potential side effects of any prescribed or recommended therapies, as well as expectations for response to treatments.    Chloe Marx NP  [facility]      SUBJECTIVE:   Otis Frank is a 52 year old female who presents to clinic today for the following health issues:  Illness    HPI  Started with nasal congestion for the past week.  Left sided ear pain, sore throat, and nausea for the past 3 days.  Now switched to right side including ear pain and sore throat.  Body aches, headache, and fever started today.  Minimal cough today.  No chest tightness, heaviness, or shortness of breath.  Vomiting x 1 today.   Minimal appetite.  Fatigued today.    Exposures - she has been around her daughter who has been sick, same symptoms, reports negative covid, mono, and strep testing.  Took Tylenol twice today      Past Medical History:   Diagnosis Date     Anxiety disorder     No Comments Provided     Essential (primary) hypertension     No Comments Provided     Personal history of other infectious and parasitic diseases     No Comments Provided     Past Surgical History:   Procedure Laterality Date     ARTHROSCOPY KNEE      84, 90,Knee surgery x2     BIOPSY BREAST      , Breast bx - benign     MAMMOPLASTY REDUCTION BILATERAL Bilateral      Social History     Tobacco Use     Smoking status: Never     Smokeless tobacco: Never   Substance Use Topics     Alcohol use: Yes     Comment: Alcoholic Drinks/day: 1-2 times per week     Current Outpatient Medications   Medication Sig Dispense Refill     amLODIPine (NORVASC) 10 MG tablet TAKE 1 TABLET(10 MG) BY MOUTH DAILY 90 tablet 0     diclofenac (VOLTAREN) 75 MG EC tablet Take 1 tablet (75 mg) by mouth 2 times daily as needed for moderate pain With food 60 tablet 1     HEMP OIL OR EXTRACT OR OTHER CBD CANNABINOID, NOT MEDICAL CANNABIS,        hydrOXYzine (VISTARIL) 25 MG capsule Take 25-50 mg three times daily as needed for itch/rash 90 capsule 0     lisinopril (ZESTRIL) 20 MG tablet Take 1 tablet (20 mg) by mouth daily 90 tablet 3     multivitamin w/minerals (THERA-VIT-M) tablet Take 1 tablet by mouth daily       mupirocin (BACTROBAN) 2 % external ointment Apply topically 3 times daily 30 g 3     predniSONE (DELTASONE) 20 MG tablet Take 3 tabs by mouth daily x 3 days, then 2 tabs daily x 3 days, then 1 tab daily x 3 days, then 1/2 tab daily x 3 days. 20 tablet 0     rosuvastatin (CRESTOR) 40 MG tablet TAKE 1 TABLET(40 MG) BY MOUTH DAILY 90 tablet 0     venlafaxine (EFFEXOR XR) 37.5 MG 24 hr capsule TAKE 1 CAPSULE BY MOUTH EVERY DAY 30 capsule 0     Allergies   Allergen Reactions      Bactrim [Sulfamethoxazole W/Trimethoprim]      Pollen Extract      Other reaction(s): Runny Nose  Itchy eyes         Past medical history, past surgical history, current medications and allergies reviewed and accurate to the best of my knowledge.        OBJECTIVE:     BP (!) 158/88 (BP Location: Right arm, Patient Position: Sitting, Cuff Size: Adult Regular)   Pulse 105   Temp (!) 100.5  F (38.1  C) (Tympanic)   Resp 18   Wt 69.9 kg (154 lb)   LMP  (LMP Unknown)   SpO2 98%   BMI 28.17 kg/m    Body mass index is 28.17 kg/m .     Physical Exam  General Appearance: Miserable appearing adult female, appropriate appearance for age. No acute distress  Ears: Left TM intact, no erythema, no effusion, no bulging, no purulence.  Right TM intact, no erythema, no effusion, no bulging, no purulence.  Left auditory canal clear without drainage or bleeding.  Right auditory canal clear without drainage or bleeding.  Normal external ears, non tender.  Eyes: conjunctivae normal without erythema or irritation, corneas clear, no drainage or crusting, no eyelid swelling, pupils equal   Orophayrnx: moist mucous membranes, pharynx with erythema, tonsils without hypertrophy, tonsils with erythema, no tonsillar exudates, no oral lesions, no palate petechiae, no post nasal drip seen, no trismus, voice clear.    Nose:  No noted drainage, congestion present  Neck: Bilateral tonsillar lymph node enlargement and tenderness to palpation   Respiratory: normal chest wall and respirations.  Normal effort.  Clear to auscultation bilaterally, no wheezing, crackles or rhonchi.  No increased work of breathing.  No cough appreciated.  Cardiac: RRR with no murmurs  Musculoskeletal:  Equal movement of bilateral upper extremities.  Equal movement of bilateral lower extremities.  Normal gait.   Psychological: normal affect, alert, oriented, and pleasant.       Labs:  Results for orders placed or performed in visit on 03/02/23   Symptomatic COVID-19  Virus (Coronavirus) by PCR Nose     Status: Normal    Specimen: Nose; Swab   Result Value Ref Range    SARS CoV2 PCR Negative Negative    Narrative    Testing was performed using the Xpert Xpress SARS-CoV-2 Assay on the Cepheid Gene-Xpert Instrument Systems. Additional information about this Emergency Use Authorization (EUA) assay can be found via the Lab Guide. This test should be ordered for the detection of SARS-CoV-2 in individuals who meet SARS-CoV-2 clinical and/or epidemiological criteria as well as from individuals without symptoms or other reasons to suspect COVID-19. Test performance for asymptomatic patients has only been established in anterior nasal swab specimens. This test is for in vitro diagnostic use under the FDA EUA for laboratories certified under CLIA to perform high complexity testing. This test has not been FDA cleared or approved. A negative result does not rule out the presence of PCR inhibitors in the specimen or target RNA concentration below the limit of detection for the assay. The possibility of a false negative should be considered if the patient's recent exposure or clinical presentation suggests COVID-19. This test was validated by [facility]. This laboratory is certified under the Clinical Laboratory Improvement Amendments (CLIA) as qualified to perform high complexity clinical laboratory testing.   Group A Streptococcus PCR Throat Swab     Status: Abnormal    Specimen: Throat; Swab   Result Value Ref Range    Group A strep by PCR Detected (A) Not Detected    Narrative    The Xpert Xpress Strep A test, performed on the [employer]  Instrument Systems, is a rapid, qualitative in vitro diagnostic test for the detection of Streptococcus pyogenes (Group A ß-hemolytic Streptococcus, Strep A) in throat swab specimens from patients with signs and symptoms of pharyngitis. The Xpert Xpress Strep A test can be used as an aid in the diagnosis  of Group A Streptococcal pharyngitis. The assay is not intended to monitor treatment for Group A Streptococcus infections. The Xpert Xpress Strep A test utilizes an automated real-time polymerase chain reaction (PCR) to detect Streptococcus pyogenes DNA.

## 2023-03-05 DIAGNOSIS — E78.49 HYPERLIPIDEMIA, FAMILIAL, HIGH LDL: ICD-10-CM

## 2023-03-08 RX ORDER — ROSUVASTATIN CALCIUM 40 MG/1
TABLET, COATED ORAL
Qty: 30 TABLET | Refills: 1 | Status: SHIPPED | OUTPATIENT
Start: 2023-03-08 | End: 2023-04-05

## 2023-03-08 NOTE — TELEPHONE ENCOUNTER
[facility] #[id number] sent Rx request for the following:      Requested Prescriptions   Pending Prescriptions Disp Refills     rosuvastatin (CRESTOR) 40 MG tablet [Pharmacy Med Name: ROSUVASTATIN 40MG TABLETS] 90 tablet 0     Sig: TAKE 1 TABLET(40 MG) BY MOUTH DAILY       Statins Protocol Failed - 3/8/2023  2:51 PM        Failed - LDL on file in past 12 months     Recent Labs   Lab Test 12/03/21  1614   *            Last Prescription Date:   11/28/2022  Last Fill Qty/Refills:         90, R-0    Last Office Visit:              11/12/2022   Future Office visit:           4/19/2023    Marcella Gold, RN on 3/8/2023 at 2:53 PM

## 2023-03-26 DIAGNOSIS — F34.1 DYSTHYMIC DISORDER: ICD-10-CM

## 2023-03-27 RX ORDER — VENLAFAXINE HYDROCHLORIDE 37.5 MG/1
CAPSULE, EXTENDED RELEASE ORAL
Qty: 23 CAPSULE | Refills: 0 | Status: SHIPPED | OUTPATIENT
Start: 2023-03-27 | End: 2023-04-17

## 2023-03-27 NOTE — TELEPHONE ENCOUNTER
[facility] sent Rx request for the following:    VENLAFAXINE ER 37.5MG CAPSULES  Last Prescription Date:   2/27/23  Last Fill Qty/Refills:         30, R-0    Last Office Visit:              10/31/22   Future Office visit:           4/19/23    Marcelle Nelson RN on 3/27/2023 at 4:39 PM

## 2023-04-04 DIAGNOSIS — E78.49 HYPERLIPIDEMIA, FAMILIAL, HIGH LDL: ICD-10-CM

## 2023-04-05 RX ORDER — ROSUVASTATIN CALCIUM 40 MG/1
TABLET, COATED ORAL
Qty: 90 TABLET | Refills: 0 | Status: SHIPPED | OUTPATIENT
Start: 2023-04-05 | End: 2023-06-30

## 2023-04-08 DIAGNOSIS — E78.49 HYPERLIPIDEMIA, FAMILIAL, HIGH LDL: ICD-10-CM

## 2023-04-10 ENCOUNTER — OFFICE VISIT (OUTPATIENT)
Dept: FAMILY MEDICINE | Facility: OTHER | Age: 53
End: 2023-04-10
Attending: NURSE PRACTITIONER
Payer: COMMERCIAL

## 2023-04-10 VITALS
HEART RATE: 81 BPM | DIASTOLIC BLOOD PRESSURE: 88 MMHG | RESPIRATION RATE: 18 BRPM | SYSTOLIC BLOOD PRESSURE: 138 MMHG | OXYGEN SATURATION: 97 % | TEMPERATURE: 98.1 F

## 2023-04-10 DIAGNOSIS — T78.40XA ALLERGIC REACTION, INITIAL ENCOUNTER: ICD-10-CM

## 2023-04-10 DIAGNOSIS — R21 RASH AND NONSPECIFIC SKIN ERUPTION: Primary | ICD-10-CM

## 2023-04-10 DIAGNOSIS — L29.9 ITCHING: ICD-10-CM

## 2023-04-10 PROCEDURE — 99213 OFFICE O/P EST LOW 20 MIN: CPT

## 2023-04-10 PROCEDURE — 87070 CULTURE OTHR SPECIMN AEROBIC: CPT | Mod: ZL

## 2023-04-10 PROCEDURE — 96372 THER/PROPH/DIAG INJ SC/IM: CPT

## 2023-04-10 PROCEDURE — 250N000011 HC RX IP 250 OP 636

## 2023-04-10 RX ORDER — DOXYCYCLINE 100 MG/1
100 CAPSULE ORAL 2 TIMES DAILY
Qty: 14 CAPSULE | Refills: 0 | Status: SHIPPED | OUTPATIENT
Start: 2023-04-10 | End: 2023-04-17

## 2023-04-10 RX ORDER — ROSUVASTATIN CALCIUM 40 MG/1
TABLET, COATED ORAL
Qty: 90 TABLET | Refills: 0 | OUTPATIENT
Start: 2023-04-10

## 2023-04-10 RX ORDER — TRIAMCINOLONE ACETONIDE 40 MG/ML
40 INJECTION, SUSPENSION INTRA-ARTICULAR; INTRAMUSCULAR ONCE
Status: COMPLETED | OUTPATIENT
Start: 2023-04-10 | End: 2023-04-10

## 2023-04-10 RX ORDER — PREDNISONE 20 MG/1
TABLET ORAL
Qty: 20 TABLET | Refills: 0 | Status: SHIPPED | OUTPATIENT
Start: 2023-04-10 | End: 2023-07-02

## 2023-04-10 RX ADMIN — TRIAMCINOLONE ACETONIDE 40 MG: 40 INJECTION, SUSPENSION INTRA-ARTICULAR; INTRAMUSCULAR at 12:27

## 2023-04-10 ASSESSMENT — PAIN SCALES - GENERAL: PAINLEVEL: MODERATE PAIN (5)

## 2023-04-10 NOTE — PROGRESS NOTES
ASSESSMENT/PLAN:    I have reviewed the nursing notes.  I have reviewed the findings, diagnosis, plan and need for follow up with the patient.    1. Rash and nonspecific skin eruption  2. Itching  3. Allergic reaction, initial encounter  - Skin Aerobic Bacterial Culture Routine with Gram Stain  - predniSONE (DELTASONE) 20 MG tablet; Take 3 tabs by mouth daily x 3 days, then 2 tabs daily x 3 days, then 1 tab daily x 3 days, then 1/2 tab daily x 3 days.  Dispense: 20 tablet; Refill: 0  - triamcinolone (KENALOG-40) injection 40 mg  - doxycycline hyclate (VIBRAMYCIN) 100 MG capsule; Take 1 capsule (100 mg) by mouth 2 times daily for 7 days  Dispense: 14 capsule; Refill: 0    Patient presents with a diffuse rash on her scalp, ears, upper back, trunk, and arms.  Patient has had the same rash in the past which was caused by an allergic reaction to her hair dye.  Her past rash also was determined to have a staph infection.  Will treat patient's rash with doxycycline twice a day for 7 days as patient had a reaction to the Bactrim last time she took it.  We will also treat the rash and itching with a prednisone taper and an injection of Kenalog in clinic.  Patient still has hydroxyzine so advised her to take this as needed along with the triamcinolone cream she was prescribed previously.  Obtained a culture of the draining skin rash on her left upper ear, culture is pending and patient will be notified of the results.  Advised patient to try to refrain from itching as much as possible and to avoid use of these hair dyes in the future.    Discussed warning signs/symptoms indicative of need to f/u    Follow up if symptoms persist or worsen or concerns    I explained my diagnostic considerations and recommendations to the patient, who voiced understanding and agreement with the treatment plan. All questions were answered. We discussed potential side effects of any prescribed or recommended therapies, as well as expectations for  response to treatments.    KYLIE Fletcher CNP  4/10/2023  12:01 PM    HPI:    Otis DARDEN Jayshree Frank is a 53 year old female  who presents to [facility] today for concerns of rash    rash, x 1 week    Description:   Location: neck, upper back, trunk of body, arms, scalp, and tops of ears  Character: raised, draining, red  Itching (Pruritis): extremely itchy  Description: Erythema, Draining, Spreading    Progression of Symptoms:  worsening    Accompanying Signs & Symptoms:  Fever: no  Body aches or joint pain: no  Sore throat symptoms: no  Recent cold symptoms: no    History:   Previous similar rash: YES    Precipitating factors:   Similar rash in past: YES- had same rash on 10/31/23 and was treated for a staph infection after having an allergic reaction to her hair dye. She was then seen shortly afterward for a worsening rash and was treated with steroids  New exposures: Hair dye   Recent travel: no  Exacerbating Factors: none    Alleviating factors: Benadryl spray    Therapies Tried and outcome: triamcinolone and Benadryl spray    Exposure History: hair dye    Additional symptoms: none    Recent medication or other changes: none    PCP: Dr. Juan Rodríguez    Past Medical History:   Diagnosis Date     Anxiety disorder     No Comments Provided     Essential (primary) hypertension     No Comments Provided     Personal history of other infectious and parasitic diseases     No Comments Provided     Past Surgical History:   Procedure Laterality Date     ARTHROSCOPY KNEE      84, 90,Knee surgery x2     BIOPSY BREAST      , Breast bx - benign     MAMMOPLASTY REDUCTION BILATERAL Bilateral      Social History     Tobacco Use     Smoking status: Never     Smokeless tobacco: Never   Vaping Use     Vaping status: Never Used   Substance Use Topics     Alcohol use: Yes     Comment: Alcoholic Drinks/day: 1-2 times per week     Current Outpatient Medications   Medication Sig Dispense Refill     amLODIPine  (NORVASC) 10 MG tablet TAKE 1 TABLET(10 MG) BY MOUTH DAILY 90 tablet 0     diclofenac (VOLTAREN) 75 MG EC tablet Take 1 tablet (75 mg) by mouth 2 times daily as needed for moderate pain With food 60 tablet 1     HEMP OIL OR EXTRACT OR OTHER CBD CANNABINOID, NOT MEDICAL CANNABIS,        hydrOXYzine (VISTARIL) 25 MG capsule Take 25-50 mg three times daily as needed for itch/rash 90 capsule 0     lisinopril (ZESTRIL) 20 MG tablet Take 1 tablet (20 mg) by mouth daily 90 tablet 3     multivitamin w/minerals (THERA-VIT-M) tablet Take 1 tablet by mouth daily       mupirocin (BACTROBAN) 2 % external ointment Apply topically 3 times daily 30 g 3     rosuvastatin (CRESTOR) 40 MG tablet TAKE 1 TABLET(40 MG) BY MOUTH DAILY 90 tablet 0     venlafaxine (EFFEXOR XR) 37.5 MG 24 hr capsule TAKE 1 CAPSULE BY MOUTH EVERY DAY 23 capsule 0     predniSONE (DELTASONE) 20 MG tablet Take 3 tabs by mouth daily x 3 days, then 2 tabs daily x 3 days, then 1 tab daily x 3 days, then 1/2 tab daily x 3 days. (Patient not taking: Reported on 4/10/2023) 20 tablet 0     Allergies   Allergen Reactions     Bactrim [Sulfamethoxazole W/Trimethoprim]      Pollen Extract      Other reaction(s): Runny Nose  Itchy eyes     Past medical history, past surgical history, current medications and allergies reviewed and accurate to the best of my knowledge.      ROS:  Refer to HPI    /88   Pulse 81   Temp 98.1  F (36.7  C) (Temporal)   Resp 18   LMP  (LMP Unknown)   SpO2 97%   Breastfeeding No     EXAM:  General Appearance: Well appearing 53 year old female, appropriate appearance for age. No acute distress   Respiratory: normal chest wall and respirations.  Normal effort. No increased work of breathing.  No cough appreciated.  Cardiac: RRR   Dermatological: Multiple erythematous, raised papules and nodules on scalp, ears, upper back, truck, and arms, skin of left upper ear is draining honey color drainage, excoriations noted on both arms  Neuro: Alert  and oriented to person, place, and time.    Psychological: normal affect, alert, oriented, and pleasant.

## 2023-04-10 NOTE — TELEPHONE ENCOUNTER
rosuvastatin (CRESTOR) 40 MG tablet 90 tablet 0 4/5/2023  No   Sig: TAKE 1 TABLET(40 MG) BY MOUTH DAILY     To [facility] requesting.    Refill request to soon.

## 2023-04-10 NOTE — NURSING NOTE
"Chief Complaint   Patient presents with     Derm Problem     Rash for 4 days and spreading   She has had a itching rash that started on her scalp after dyeing her hair 4 days ago and now spreading on her chest and arms. She states she has had this one other time after dyeing her hair.  Margaret Rucker LPN..................4/10/2023   11:57 AM      Initial /88   Pulse 81   Temp 98.1  F (36.7  C) (Temporal)   Resp 18   LMP  (LMP Unknown)   SpO2 97%   Breastfeeding No  Estimated body mass index is 28.17 kg/m  as calculated from the following:    Height as of 7/28/22: 1.575 m (5' 2\").    Weight as of 3/2/23: 69.9 kg (154 lb).  Medication Reconciliation: complete    FOOD SECURITY SCREENING QUESTIONS  Hunger Vital Signs:  Within the past 12 months we worried whether our food would run out before we got money to buy more. Never  Within the past 12 months the food we bought just didn't last and we didn't have money to get more. Never        Advance care directive on file? no  Advance care directive provided to patient? declined     Margaret Rucker, KRISTOFER  "

## 2023-04-13 LAB
BACTERIA SKIN AEROBE CULT: ABNORMAL
GRAM STAIN RESULT: ABNORMAL

## 2023-04-17 ENCOUNTER — MYC REFILL (OUTPATIENT)
Dept: FAMILY MEDICINE | Facility: OTHER | Age: 53
End: 2023-04-17
Payer: COMMERCIAL

## 2023-04-17 DIAGNOSIS — E78.49 HYPERLIPIDEMIA, FAMILIAL, HIGH LDL: ICD-10-CM

## 2023-04-17 DIAGNOSIS — I10 ESSENTIAL HYPERTENSION: ICD-10-CM

## 2023-04-17 DIAGNOSIS — F34.1 DYSTHYMIC DISORDER: ICD-10-CM

## 2023-04-17 RX ORDER — AMLODIPINE BESYLATE 10 MG/1
TABLET ORAL
Qty: 90 TABLET | Refills: 0 | OUTPATIENT
Start: 2023-04-17

## 2023-04-17 RX ORDER — AMLODIPINE BESYLATE 10 MG/1
TABLET ORAL
Qty: 90 TABLET | Refills: 0 | Status: SHIPPED | OUTPATIENT
Start: 2023-04-17 | End: 2023-04-20

## 2023-04-17 RX ORDER — VENLAFAXINE HYDROCHLORIDE 37.5 MG/1
CAPSULE, EXTENDED RELEASE ORAL
Qty: 90 CAPSULE | Refills: 0 | Status: SHIPPED | OUTPATIENT
Start: 2023-04-17 | End: 2023-06-30

## 2023-04-17 NOTE — TELEPHONE ENCOUNTER
[facility] sent Rx request for the following:      Requested Prescriptions   Pending Prescriptions Disp Refills     amLODIPine (NORVASC) 10 MG tablet [Pharmacy Med Name: AMLODIPINE BESYLATE 10MG TABLETS] 90 tablet 0     Sig: TAKE 1 TABLET(10 MG) BY MOUTH DAILY   Last Prescription Date:   1/31/23  Last Fill Qty/Refills:         90, R-0       venlafaxine (EFFEXOR XR) 37.5 MG 24 hr capsule [Pharmacy Med Name: VENLAFAXINE ER 37.5MG CAPSULES] 23 capsule 0     Sig: TAKE 1 CAPSULE BY MOUTH EVERY DAY   Last Prescription Date:   3/27/23  Last Fill Qty/Refills:         23, R-0      Serotonin-Norepinephrine Reuptake Inhibitors  Failed - 4/17/2023 11:23 AM        Failed - PHQ-9 score of less than 5 in past 6 months     Please review last PHQ-9 score.      Last Office Visit:              10/31/22   Future Office visit:             Next 5 appointments (look out 90 days)    May 24, 2023  3:20 PM  PHYSICAL with Jennifer Rodríguez MD  [facility] ([facility] ) [address]  [place] [address]  [phone]        Cara Diaz RN .............. 4/17/2023  11:25 AM

## 2023-04-18 ENCOUNTER — MYC MEDICAL ADVICE (OUTPATIENT)
Dept: FAMILY MEDICINE | Facility: OTHER | Age: 53
End: 2023-04-18
Payer: COMMERCIAL

## 2023-04-18 DIAGNOSIS — I10 ESSENTIAL HYPERTENSION: ICD-10-CM

## 2023-04-18 RX ORDER — LISINOPRIL 20 MG/1
20 TABLET ORAL DAILY
Qty: 90 TABLET | Refills: 3 | Status: SHIPPED | OUTPATIENT
Start: 2023-04-18 | End: 2024-03-29

## 2023-04-18 NOTE — TELEPHONE ENCOUNTER
She is supposed to be taking this.  Prescriptions are getting flagged as she is past due for follow up.  Jennifer Hernandez MD

## 2023-04-20 DIAGNOSIS — I10 ESSENTIAL HYPERTENSION: ICD-10-CM

## 2023-04-20 RX ORDER — AMLODIPINE BESYLATE 10 MG/1
TABLET ORAL
Qty: 90 TABLET | Refills: 0 | Status: SHIPPED | OUTPATIENT
Start: 2023-04-20 | End: 2023-06-30

## 2023-04-20 RX ORDER — AMLODIPINE BESYLATE 10 MG/1
10 TABLET ORAL DAILY
Qty: 90 TABLET | Refills: 0 | OUTPATIENT
Start: 2023-04-20

## 2023-04-20 RX ORDER — ROSUVASTATIN CALCIUM 40 MG/1
40 TABLET, COATED ORAL DAILY
Qty: 90 TABLET | Refills: 0 | OUTPATIENT
Start: 2023-04-20

## 2023-04-20 NOTE — TELEPHONE ENCOUNTER
Patient sent Rx request for the following:      Pending Prescriptions Disp   amLODIPine (NORVASC) 10 MG tablet 90 tablet   rosuvastatin (CRESTOR) 40 MG tablet 90 tablet     Last Prescription Date:   4/17/23, 4/5/23  Last Fill Qty/Refills:         90, R-0    Last Office Visit:              10/31/22   Future Office visit:           5/24/23  Redundant refill request refused: Too soon:    Marcelle Nelson RN on 4/20/2023 at 8:20 AM   
1 week

## 2023-04-27 ENCOUNTER — MYC MEDICAL ADVICE (OUTPATIENT)
Dept: FAMILY MEDICINE | Facility: OTHER | Age: 53
End: 2023-04-27
Payer: COMMERCIAL

## 2023-04-28 NOTE — TELEPHONE ENCOUNTER
Call patient please - she was seen in [facility] for this rash.  If still having concerns she should be seen.  Jennifer Hernandez MD

## 2023-05-06 ENCOUNTER — HEALTH MAINTENANCE LETTER (OUTPATIENT)
Age: 53
End: 2023-05-06

## 2023-06-30 ENCOUNTER — OFFICE VISIT (OUTPATIENT)
Dept: FAMILY MEDICINE | Facility: OTHER | Age: 53
End: 2023-06-30
Attending: FAMILY MEDICINE
Payer: COMMERCIAL

## 2023-06-30 VITALS
WEIGHT: 174.2 LBS | RESPIRATION RATE: 16 BRPM | DIASTOLIC BLOOD PRESSURE: 88 MMHG | TEMPERATURE: 98.5 F | HEART RATE: 80 BPM | HEIGHT: 62 IN | OXYGEN SATURATION: 99 % | SYSTOLIC BLOOD PRESSURE: 148 MMHG | BODY MASS INDEX: 32.06 KG/M2

## 2023-06-30 DIAGNOSIS — I10 ESSENTIAL HYPERTENSION: ICD-10-CM

## 2023-06-30 DIAGNOSIS — Z00.00 PHYSICAL EXAM, ANNUAL: Primary | ICD-10-CM

## 2023-06-30 DIAGNOSIS — E78.49 HYPERLIPIDEMIA, FAMILIAL, HIGH LDL: ICD-10-CM

## 2023-06-30 DIAGNOSIS — F34.1 DYSTHYMIC DISORDER: ICD-10-CM

## 2023-06-30 DIAGNOSIS — Z80.3 FAMILY HISTORY OF MALIGNANT NEOPLASM OF BREAST: ICD-10-CM

## 2023-06-30 LAB
ALBUMIN SERPL BCG-MCNC: 5 G/DL (ref 3.5–5.2)
ALBUMIN UR-MCNC: NEGATIVE MG/DL
ALP SERPL-CCNC: 112 U/L (ref 35–104)
ALT SERPL W P-5'-P-CCNC: 27 U/L (ref 0–50)
ANION GAP SERPL CALCULATED.3IONS-SCNC: 12 MMOL/L (ref 7–15)
APPEARANCE UR: CLEAR
AST SERPL W P-5'-P-CCNC: 25 U/L (ref 0–45)
BILIRUB SERPL-MCNC: 0.3 MG/DL
BILIRUB UR QL STRIP: NEGATIVE
BUN SERPL-MCNC: 13.9 MG/DL (ref 6–20)
CALCIUM SERPL-MCNC: 10.3 MG/DL (ref 8.6–10)
CHLORIDE SERPL-SCNC: 96 MMOL/L (ref 98–107)
CHOLEST SERPL-MCNC: 266 MG/DL
COLOR UR AUTO: YELLOW
CREAT SERPL-MCNC: 0.69 MG/DL (ref 0.51–0.95)
DEPRECATED HCO3 PLAS-SCNC: 27 MMOL/L (ref 22–29)
ERYTHROCYTE [DISTWIDTH] IN BLOOD BY AUTOMATED COUNT: 13.2 % (ref 10–15)
GFR SERPL CREATININE-BSD FRML MDRD: >90 ML/MIN/1.73M2
GLUCOSE SERPL-MCNC: 104 MG/DL (ref 70–99)
GLUCOSE UR STRIP-MCNC: NEGATIVE MG/DL
HCT VFR BLD AUTO: 42.8 % (ref 35–47)
HDLC SERPL-MCNC: 118 MG/DL
HGB BLD-MCNC: 14.6 G/DL (ref 11.7–15.7)
HGB UR QL STRIP: NEGATIVE
HOLD SPECIMEN: NORMAL
KETONES UR STRIP-MCNC: NEGATIVE MG/DL
LDLC SERPL CALC-MCNC: 137 MG/DL
LEUKOCYTE ESTERASE UR QL STRIP: NEGATIVE
MCH RBC QN AUTO: 32.1 PG (ref 26.5–33)
MCHC RBC AUTO-ENTMCNC: 34.1 G/DL (ref 31.5–36.5)
MCV RBC AUTO: 94 FL (ref 78–100)
NITRATE UR QL: NEGATIVE
NONHDLC SERPL-MCNC: 148 MG/DL
PH UR STRIP: 5.5 [PH] (ref 5–9)
PLATELET # BLD AUTO: 460 10E3/UL (ref 150–450)
POTASSIUM SERPL-SCNC: 3.8 MMOL/L (ref 3.4–5.3)
PROT SERPL-MCNC: 8.3 G/DL (ref 6.4–8.3)
RBC # BLD AUTO: 4.55 10E6/UL (ref 3.8–5.2)
SODIUM SERPL-SCNC: 135 MMOL/L (ref 136–145)
SP GR UR STRIP: 1.01 (ref 1–1.03)
TRIGL SERPL-MCNC: 53 MG/DL
UROBILINOGEN UR STRIP-MCNC: NORMAL MG/DL
WBC # BLD AUTO: 7.9 10E3/UL (ref 4–11)

## 2023-06-30 PROCEDURE — 85027 COMPLETE CBC AUTOMATED: CPT | Mod: ZL | Performed by: FAMILY MEDICINE

## 2023-06-30 PROCEDURE — 80053 COMPREHEN METABOLIC PANEL: CPT | Mod: ZL | Performed by: FAMILY MEDICINE

## 2023-06-30 PROCEDURE — 99396 PREV VISIT EST AGE 40-64: CPT | Performed by: FAMILY MEDICINE

## 2023-06-30 PROCEDURE — 36415 COLL VENOUS BLD VENIPUNCTURE: CPT | Mod: ZL | Performed by: FAMILY MEDICINE

## 2023-06-30 PROCEDURE — 80061 LIPID PANEL: CPT | Mod: ZL | Performed by: FAMILY MEDICINE

## 2023-06-30 PROCEDURE — 81003 URINALYSIS AUTO W/O SCOPE: CPT | Mod: ZL | Performed by: FAMILY MEDICINE

## 2023-06-30 RX ORDER — AMLODIPINE BESYLATE 10 MG/1
10 TABLET ORAL DAILY
Qty: 90 TABLET | Refills: 3 | Status: SHIPPED | OUTPATIENT
Start: 2023-06-30 | End: 2024-07-25

## 2023-06-30 RX ORDER — ROSUVASTATIN CALCIUM 40 MG/1
40 TABLET, COATED ORAL DAILY
Qty: 90 TABLET | Refills: 3 | Status: SHIPPED | OUTPATIENT
Start: 2023-06-30

## 2023-06-30 RX ORDER — VENLAFAXINE HYDROCHLORIDE 37.5 MG/1
37.5 CAPSULE, EXTENDED RELEASE ORAL DAILY
Qty: 90 CAPSULE | Refills: 3 | Status: SHIPPED | OUTPATIENT
Start: 2023-06-30 | End: 2024-07-09

## 2023-06-30 ASSESSMENT — ENCOUNTER SYMPTOMS
PARESTHESIAS: 0
HEMATURIA: 0
PALPITATIONS: 0
EYE PAIN: 0
SHORTNESS OF BREATH: 0
COUGH: 0
JOINT SWELLING: 0
NAUSEA: 0
DIZZINESS: 0
NERVOUS/ANXIOUS: 0
DYSURIA: 0
ABDOMINAL PAIN: 0
SORE THROAT: 0
FREQUENCY: 0
HEARTBURN: 0
CONSTIPATION: 0
HEMATOCHEZIA: 0
DIARRHEA: 0
CHILLS: 0
WEAKNESS: 0
HEADACHES: 0
MYALGIAS: 0
ARTHRALGIAS: 0
FEVER: 0

## 2023-06-30 ASSESSMENT — PATIENT HEALTH QUESTIONNAIRE - PHQ9
10. IF YOU CHECKED OFF ANY PROBLEMS, HOW DIFFICULT HAVE THESE PROBLEMS MADE IT FOR YOU TO DO YOUR WORK, TAKE CARE OF THINGS AT HOME, OR GET ALONG WITH OTHER PEOPLE: NOT DIFFICULT AT ALL
SUM OF ALL RESPONSES TO PHQ QUESTIONS 1-9: 0
SUM OF ALL RESPONSES TO PHQ QUESTIONS 1-9: 0

## 2023-06-30 ASSESSMENT — PAIN SCALES - GENERAL: PAINLEVEL: NO PAIN (0)

## 2023-06-30 NOTE — PROGRESS NOTES
Answers for HPI/ROS submitted by the patient on 6/30/2023  If you checked off any problems, how difficult have these problems made it for you to do your work, take care of things at home, or get along with other people?: Not difficult at all  PHQ9 TOTAL SCORE: 0  Frequency of exercise:: 6-7 days/week  Getting at least 3 servings of Calcium per day:: Yes  Diet:: Low fat/cholesterol, Carbohydrate counting, Gluten-free/reduced, Breakfast skipped  Taking medications regularly:: Yes  Medication side effects:: None  Bi-annual eye exam:: NO  Dental care twice a year:: NO  Sleep apnea or symptoms of sleep apnea:: None  abdominal pain: No  Blood in stool: No  Blood in urine: No  chest pain: No  chills: No  congestion: No  constipation: No  cough: No  diarrhea: No  dizziness: No  ear pain: No  eye pain: No  nervous/anxious: No  fever: No  frequency: No  genital sores: No  headaches: No  hearing loss: No  heartburn: No  arthralgias: No  joint swelling: No  peripheral edema: No  mood changes: No  myalgias: No  nausea: No  dysuria: No  palpitations: No  Skin sensation changes: No  sore throat: No  urgency: No  rash: No  shortness of breath: No  visual disturbance: No  weakness: No  Additional concerns today:: No  Duration of exercise:: 45-60 minutes

## 2023-06-30 NOTE — NURSING NOTE
"Chief Complaint   Patient presents with     Physical     Patient is here for annual physical     Initial BP (!) 154/100   Pulse 80   Temp 98.5  F (36.9  C) (Tympanic)   Resp 16   Ht 1.575 m (5' 2\")   Wt 79 kg (174 lb 3.2 oz)   LMP 01/01/2017 (Approximate)   SpO2 99%   Breastfeeding No   BMI 31.86 kg/m   Estimated body mass index is 31.86 kg/m  as calculated from the following:    Height as of this encounter: 1.575 m (5' 2\").    Weight as of this encounter: 79 kg (174 lb 3.2 oz).  Medication Reconciliation: complete    Karen Sabillon CMA       FOOD SECURITY SCREENING QUESTIONS:    The next two questions are to help us understand your food security.  If you are feeling you need any assistance in this area, we have resources available to support you today.    Hunger Vital Signs:  Within the past 12 months we worried whether our food would run out before we got money to buy more. Never  Within the past 12 months the food we bought just didn't last and we didn't have money to get more. Never  Karen Sabillon CMA,LPN on 6/30/2023 at 2:05 PM      "

## 2023-06-30 NOTE — PROGRESS NOTES
SUBJECTIVE:   CC: Otis is an 53 year old who presents for preventive health visit.       6/30/2023     2:00 PM   Additional Questions   Roomed by RODNEY Jones   Accompanied by Self     Healthy Habits:     Getting at least 3 servings of Calcium per day:  Yes    Bi-annual eye exam:  NO    Dental care twice a year:  NO    Sleep apnea or symptoms of sleep apnea:  None    Diet:  Low fat/cholesterol, Carbohydrate counting, Gluten-free/reduced and Breakfast skipped    Frequency of exercise:  6-7 days/week    Duration of exercise:  45-60 minutes    Taking medications regularly:  Yes    Medication side effects:  None    Additional concerns today:  No      Today's PHQ-9 Score:       6/30/2023     1:50 PM   PHQ-9 SCORE   PHQ-9 Total Score MyChart 0   PHQ-9 Total Score 0       1.  She is now working out about an hour a day.  2.  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  Hyperlipidemia  4.  Hypertension           Have you ever done Advance Care Planning? (For example, a Health Directive, POLST, or a discussion with a medical provider or your loved ones about your wishes): No, advance care planning information given to patient to review.  Patient declined advance care planning discussion at this time.    Social History     Tobacco Use     Smoking status: Never     Smokeless tobacco: Never   Substance Use Topics     Alcohol use: Yes     Comment: Alcoholic Drinks/day: 1-2 times per week             6/30/2023     1:54 PM   Alcohol Use   Prescreen: >3 drinks/day or >7 drinks/week? No     Reviewed orders with patient.  Reviewed health maintenance and updated orders accordingly - Yes      Breast Cancer Screening:    FHS-7:       6/30/2023     1:54 PM   Breast CA Risk Assessment (FHS-7)   Did any of your first-degree relatives have breast or ovarian cancer? Yes   Did any of your relatives have bilateral breast cancer? Unknown       Mammogram Screening: Recommended annual mammography  Pertinent  mammograms are reviewed under the imaging tab.    History of abnormal Pap smear: NO - age 30-65 PAP every 5 years with negative HPV co-testing recommended      Latest Ref Rng & Units 3/24/2021     8:53 AM   PAP / HPV   PAP (Historical)  NIL    HPV 16 DNA NEG^Negative Negative    HPV 18 DNA NEG^Negative Negative    Other HR HPV NEG^Negative Negative      Reviewed and updated as needed this visit by clinical staff   Tobacco  Allergies  Meds              Reviewed and updated as needed this visit by Provider                 Past Medical History:   Diagnosis Date     Anxiety disorder     No Comments Provided     Essential (primary) hypertension     No Comments Provided     Personal history of other infectious and parasitic diseases     No Comments Provided      Past Surgical History:   Procedure Laterality Date     ARTHROSCOPY KNEE      82, 88,Knee surgery x2     ARTHROSCOPY KNEE Left     [place]     BIOPSY BREAST      , Breast bx - benign     MAMMOPLASTY REDUCTION BILATERAL Bilateral      OB History    Para Term  AB Living   2 2 2 0 0 2   SAB IAB Ectopic Multiple Live Births   0 0 0 0 0       Review of Systems   Constitutional: Negative for chills and fever.   HENT: Negative for congestion, ear pain, hearing loss and sore throat.    Eyes: Negative for pain and visual disturbance.   Respiratory: Negative for cough and shortness of breath.    Cardiovascular: Negative for chest pain, palpitations and peripheral edema.   Gastrointestinal: Negative for abdominal pain, constipation, diarrhea, heartburn, hematochezia and nausea.   Genitourinary: Negative for dysuria, frequency, genital sores, hematuria and urgency.   Musculoskeletal: Negative for arthralgias, joint swelling and myalgias.   Skin: Negative for rash.   Neurological: Negative for dizziness, weakness, headaches and paresthesias.   Psychiatric/Behavioral: Negative for mood changes. The patient is not nervous/anxious.      Rash - started last  "fall, staph infection; has cats and is allergic; it has not ever completely gone away; on prednisone twice and that helped some; the itching it better  ; this included her head too    Left knee pain     OBJECTIVE:   BP (!) 148/88   Pulse 80   Temp 98.5  F (36.9  C) (Tympanic)   Resp 16   Ht 1.575 m (5' 2\")   Wt 79 kg (174 lb 3.2 oz)   LMP 01/01/2017 (Approximate)   SpO2 99%   Breastfeeding No   BMI 31.86 kg/m    Physical Exam  GENERAL: healthy, alert and no distress  EYES: Eyes grossly normal to inspection, PERRL and conjunctivae and sclerae normal  HENT: ear canals and TM's normal, nose and mouth without ulcers or lesions  NECK: no adenopathy, no asymmetry, masses, or scars and thyroid normal to palpation  RESP: lungs clear to auscultation - no rales, rhonchi or wheezes  BREAST: surgical scars, no masses  CV: regular rate and rhythm, no murmur  ABDOMEN: soft, nontender, no hepatosplenomegaly, no masses and bowel sounds normal  MS: no gross musculoskeletal defects noted, no edema  SKIN: no suspicious lesions or rashes  NEURO: Normal strength and tone, mentation intact and speech normal  PSYCH: mentation appears normal, affect normal/bright        ASSESSMENT/PLAN:       ICD-10-CM    1. Physical exam, annual  Z00.00       2. Essential hypertension  I10 amLODIPine (NORVASC) 10 MG tablet     Comprehensive Metabolic Panel     CBC W PLT No Diff     Lipid Profile     UA Macroscopic with reflex to Microscopic and Culture      3. Hyperlipidemia, familial, high LDL  E78.49 rosuvastatin (CRESTOR) 40 MG tablet     Comprehensive Metabolic Panel     CBC W PLT No Diff     Lipid Profile     UA Macroscopic with reflex to Microscopic and Culture      4. Dysthymic disorder  F34.1 venlafaxine (EFFEXOR XR) 37.5 MG 24 hr capsule      5. Family history of malignant neoplasm of breast  Z80.3         1.  Labs due today  2.  Continue Effexor - same dose  3.  Mammogram scheduled - high risk due to FH  4.  Patient to check BPs over " "next month and will be contacted to send in results - may need medication change such as diuretic added  5.  Continue statin  6.  Goals for upcoming year - BP, pull up, core strength and 5 minute plank           COUNSELING:  Reviewed preventive health counseling, as reflected in patient instructions      BMI:   Estimated body mass index is 31.86 kg/m  as calculated from the following:    Height as of this encounter: 1.575 m (5' 2\").    Weight as of this encounter: 79 kg (174 lb 3.2 oz).   Wt Readings from Last 4 Encounters:   06/30/23 79 kg (174 lb 3.2 oz)   03/02/23 69.9 kg (154 lb)   07/28/22 72.1 kg (159 lb)   07/07/22 69.9 kg (154 lb)           She reports that she has never smoked. She has never used smokeless tobacco.      JOHN FLORES MD  [facility]  Answers for HPI/ROS submitted by the patient on 6/30/2023  If you checked off any problems, how difficult have these problems made it for you to do your work, take care of things at home, or get along with other people?: Not difficult at all  PHQ9 TOTAL SCORE: 0      "

## 2023-07-24 ENCOUNTER — HOSPITAL ENCOUNTER (OUTPATIENT)
Dept: MAMMOGRAPHY | Facility: OTHER | Age: 53
Discharge: HOME | End: 2023-07-24
Attending: FAMILY MEDICINE | Admitting: FAMILY MEDICINE
Payer: COMMERCIAL

## 2023-07-24 DIAGNOSIS — Z12.31 VISIT FOR SCREENING MAMMOGRAM: ICD-10-CM

## 2023-07-24 PROCEDURE — 77067 SCR MAMMO BI INCL CAD: CPT

## 2024-04-24 NOTE — NURSING NOTE
Patient is here for medication check, high blood pressure, and a lump on the left side of her neck top of shoulder.     No LMP recorded (lmp unknown). Patient is postmenopausal.  Medication Reconciliation: complete    Irene Avila LPN  3/24/2021 8:36 AM    
Patient arrives w/ cast, ACE bandage and sling in place, c/o of pain and swelling to right hand.  Patient was seen in ED 2 days ago s/p mechanical trip and fall, Dx w/ fracture of right hand metacarpal.  + radial pulse , able to wiggle fingers, + cap refill, warm to touch.  Last took Tylenol 9am today.

## 2024-07-24 DIAGNOSIS — I10 ESSENTIAL HYPERTENSION: ICD-10-CM

## 2024-07-25 RX ORDER — AMLODIPINE BESYLATE 10 MG/1
10 TABLET ORAL DAILY
Qty: 90 TABLET | Refills: 0 | Status: SHIPPED | OUTPATIENT
Start: 2024-07-25

## 2024-07-25 NOTE — TELEPHONE ENCOUNTER
[facility] sent Rx request for the following:      Requested Prescriptions   Pending Prescriptions Disp Refills    amLODIPine (NORVASC) 10 MG tablet [Pharmacy Med Name: AMLODIPINE BESYLATE 10MG TABLETS] 90 tablet 3     Sig: TAKE 1 TABLET(10 MG) BY MOUTH DAILY       Calcium Channel Blockers Protocol  Failed - 7/25/2024  2:23 PM        Failed - Blood pressure under 140/90 in past 12 months     BP Readings from Last 3 Encounters:   06/30/23 (!) 148/88   04/10/23 138/88   03/02/23 (!) 158/88       No data recorded            Failed - GFR is on file in the past 12 months and most recent GFR is normal       Last Prescription Date:   6/30/23  Last Fill Qty/Refills:         90, R-3    Last Office Visit:              6/30/23   Future Office visit:             Next 5 appointments (look out 90 days)      Oct 08, 2024 8:40 AM  (Arrive by 8:25 AM)  Adult Preventative Visit with Jennifer Rodríguez MD  [facility] ([facility] ) [address]  [place] [address]  [phone]           Routing refill request to provider for review/approval because:  Patient needs to be seen because it has been more than 1 year since last office visit.    Ofelia Hayden RN on 7/25/2024 at 2:25 PM

## 2024-08-04 DIAGNOSIS — F34.1 DYSTHYMIC DISORDER: ICD-10-CM

## 2024-08-04 DIAGNOSIS — I10 ESSENTIAL HYPERTENSION: ICD-10-CM

## 2024-08-06 RX ORDER — VENLAFAXINE HYDROCHLORIDE 37.5 MG/1
37.5 CAPSULE, EXTENDED RELEASE ORAL DAILY
Qty: 30 CAPSULE | Refills: 1 | Status: SHIPPED | OUTPATIENT
Start: 2024-08-06 | End: 2024-09-08

## 2024-08-06 RX ORDER — LISINOPRIL 20 MG/1
20 TABLET ORAL DAILY
Qty: 30 TABLET | Refills: 1 | Status: SHIPPED | OUTPATIENT
Start: 2024-08-06 | End: 2024-09-08

## 2024-08-06 NOTE — TELEPHONE ENCOUNTER
[facility] sent Rx request for the following:      Requested Prescriptions   Pending Prescriptions Disp Refills    venlafaxine (EFFEXOR XR) 37.5 MG 24 hr capsule [Pharmacy Med Name: VENLAFAXINE ER 37.5MG CAPSULES] 30 capsule 0     Sig: TAKE 1 CAPSULE(37.5 MG) BY MOUTH DAILY       Serotonin-Norepinephrine Reuptake Inhibitors  Failed - 8/4/2024  9:03 PM        Failed - Blood pressure under 140/90 in past 12 months     BP Readings from Last 3 Encounters:   06/30/23 (!) 148/88   04/10/23 138/88 03/02/23 (!) 158/88       No data recorded            Failed - PHQ-9 score of less than 5 in past 6 months     Please review last PHQ-9 score.           Failed - Recent (12 mo) or future (90 days) visit within the authorizing provider's specialty     The patient must have completed an in-person or virtual visit within the past 12 months or has a future visit scheduled within the next 90 days with the authorizing provider s specialty.  Urgent care and e-visits do not quality as an office visit for this protocol.     Last Prescription Date:   7/9/24  Last Fill Qty/Refills:         30, R-0             lisinopril (ZESTRIL) 20 MG tablet [Pharmacy Med Name: LISINOPRIL 20MG TABLETS] 30 tablet 0     Sig: TAKE 1 TABLET(20 MG) BY MOUTH DAILY       ACE Inhibitors (Including Combos) Protocol Failed - 8/4/2024  9:03 PM        Failed - Blood pressure under 140/90 in past 12 months- Clinicial or Patient Reported     BP Readings from Last 3 Encounters:   06/30/23 (!) 148/88   04/10/23 138/88 03/02/23 (!) 158/88       No data recorded            Failed - Has GFR on file in past 12 months and most recent value is normal        Failed - Recent (12 mo) or future (90 days) visit within the authorizing provider's specialty     The patient must have completed an in-person or virtual visit within the past 12 months or has a future visit scheduled within the next 90 days with the authorizing provider s specialty.  Urgent care  and e-visits do not quality as an office visit for this protocol.          Failed - Normal serum potassium on file in past 12 months     Recent Labs   Lab Test 06/30/23  1454   POTASSIUM 3.8            Last Prescription Date:   7/9/24  Last Fill Qty/Refills:         30, R-0    Last Office Visit:              6/30/23   Future Office visit:           10/8/24    Routing refill request to provider for review/approval because:  Patient needs to be seen because it has been more than 1 year since last office visit.    Ankita Mccollum RN on 8/6/2024 at 2:20 PM

## 2024-09-07 DIAGNOSIS — F34.1 DYSTHYMIC DISORDER: ICD-10-CM

## 2024-09-07 DIAGNOSIS — I10 ESSENTIAL HYPERTENSION: ICD-10-CM

## 2024-09-08 RX ORDER — VENLAFAXINE HYDROCHLORIDE 37.5 MG/1
37.5 CAPSULE, EXTENDED RELEASE ORAL DAILY
Qty: 30 CAPSULE | Refills: 0 | Status: SHIPPED | OUTPATIENT
Start: 2024-09-08 | End: 2024-09-30

## 2024-09-08 RX ORDER — LISINOPRIL 20 MG/1
20 TABLET ORAL DAILY
Qty: 30 TABLET | Refills: 0 | Status: SHIPPED | OUTPATIENT
Start: 2024-09-08 | End: 2024-09-30

## 2024-09-21 ENCOUNTER — HEALTH MAINTENANCE LETTER (OUTPATIENT)
Age: 54
End: 2024-09-21

## 2024-09-25 DIAGNOSIS — I10 ESSENTIAL HYPERTENSION: ICD-10-CM

## 2024-09-25 DIAGNOSIS — F34.1 DYSTHYMIC DISORDER: ICD-10-CM

## 2024-09-30 RX ORDER — LISINOPRIL 20 MG/1
20 TABLET ORAL DAILY
Qty: 30 TABLET | Refills: 0 | Status: SHIPPED | OUTPATIENT
Start: 2024-09-30

## 2024-09-30 RX ORDER — VENLAFAXINE HYDROCHLORIDE 37.5 MG/1
37.5 CAPSULE, EXTENDED RELEASE ORAL DAILY
Qty: 30 CAPSULE | Refills: 0 | Status: SHIPPED | OUTPATIENT
Start: 2024-09-30

## 2024-09-30 NOTE — TELEPHONE ENCOUNTER
[facility] sent Rx request for the following:      Requested Prescriptions   Pending Prescriptions Disp Refills    lisinopril (ZESTRIL) 20 MG tablet [Pharmacy Med Name: LISINOPRIL 20MG TABLETS] 30 tablet 0     Sig: TAKE 1 TABLET(20 MG) BY MOUTH DAILY   Last Prescription Date:   9/8/24  Last Fill Qty/Refills:         30, R-0      ACE Inhibitors (Including Combos) Protocol Failed - 9/30/2024 11:10 AM        Failed - Blood pressure under 140/90 in past 12 months- Clinicial or Patient Reported     BP Readings from Last 3 Encounters:   06/30/23 (!) 148/88   04/10/23 138/88 03/02/23 (!) 158/88   No data recorded        Failed - Recent (12 mo) or future (90 days) visit within the authorizing provider's specialty     The patient must have completed an in-person or virtual visit within the past 12 months or has a future visit scheduled within the next 90 days with the authorizing provider s specialty.  Urgent care and e-visits do not quality as an office visit for this protocol.        Failed - Most recent GFR on file in the past 12 months >30        Failed - Normal serum potassium on file in past 12 months     Recent Labs   Lab Test 06/30/23  1454   POTASSIUM 3.8          venlafaxine (EFFEXOR XR) 37.5 MG 24 hr capsule [Pharmacy Med Name: VENLAFAXINE ER 37.5MG CAPSULES] 30 capsule 0     Sig: TAKE 1 CAPSULE(37.5 MG) BY MOUTH DAILY   Last Prescription Date:   9/8/24  Last Fill Qty/Refills:         30, R-0      Serotonin-Norepinephrine Reuptake Inhibitors  Failed - 9/30/2024 11:10 AM        Failed - Blood pressure under 140/90 in past 12 months     BP Readings from Last 3 Encounters:   06/30/23 (!) 148/88   04/10/23 138/88 03/02/23 (!) 158/88   No data recorded        Failed - PHQ-9 score of less than 5 in past 6 months     Please review last PHQ-9 score.         Failed - Recent (12 mo) or future (90 days) visit within the authorizing provider's specialty     The patient must have completed an  in-person or virtual visit within the past 12 months or has a future visit scheduled within the next 90 days with the authorizing provider s specialty.  Urgent care and e-visits do not quality as an office visit for this protocol.     Last Office Visit:              6/30/23 (Px)   Future Office visit:           10/8/24    Unable to complete prescription refill per RN Medication Refill Policy. Cara Diaz RN .............. 9/30/2024  11:11 AM

## 2024-11-06 ENCOUNTER — MYC REFILL (OUTPATIENT)
Dept: FAMILY MEDICINE | Facility: OTHER | Age: 54
End: 2024-11-06
Payer: COMMERCIAL

## 2024-11-06 DIAGNOSIS — I10 ESSENTIAL HYPERTENSION: ICD-10-CM

## 2024-11-06 DIAGNOSIS — F34.1 DYSTHYMIC DISORDER: ICD-10-CM

## 2024-11-07 RX ORDER — VENLAFAXINE HYDROCHLORIDE 37.5 MG/1
37.5 CAPSULE, EXTENDED RELEASE ORAL DAILY
Qty: 90 CAPSULE | Refills: 0 | Status: SHIPPED | OUTPATIENT
Start: 2024-11-07

## 2024-11-07 RX ORDER — LISINOPRIL 20 MG/1
20 TABLET ORAL DAILY
Qty: 90 TABLET | Refills: 0 | Status: SHIPPED | OUTPATIENT
Start: 2024-11-07

## 2024-11-07 RX ORDER — AMLODIPINE BESYLATE 10 MG/1
10 TABLET ORAL DAILY
Qty: 90 TABLET | Refills: 0 | Status: SHIPPED | OUTPATIENT
Start: 2024-11-07

## 2024-11-07 NOTE — TELEPHONE ENCOUNTER
Nick sent:    My father is dying of glioblastoma.  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 and I have not had time to make another appointment.  I have been checking my blood pressure and it is averaging about 120-130 by 89 90s.  I am hoping that you can refill my prescriptions for blood pressure and effexor and I promise to make an appointment for a physical soon.    Thanks  Otis      Requested Prescriptions   Pending Prescriptions Disp Refills    amLODIPine (NORVASC) 10 MG tablet 7 tablet 0     Sig: Take 1 tablet (10 mg) by mouth daily.       Calcium Channel Blockers Protocol  Failed - 11/7/2024  2:22 PM        Failed - Most recent blood pressure under 140/90 in past 12 months     BP Readings from Last 3 Encounters:   06/30/23 (!) 148/88   04/10/23 138/88   03/02/23 (!) 158/88       No data recorded            Failed - GFR is on file in the past 12 months and most recent GFR is normal        Failed - Recent (12 mo) or future (90 days) visit within the authorizing provider's specialty     The patient must have completed an in-person or virtual visit within the past 12 months or has a future visit scheduled within the next 90 days with the authorizing provider s specialty.  Urgent care and e-visits do not quality as an office visit for this protocol.           Last Prescription Date:   10/30/2024  Last Fill Qty/Refills:         7, R-0    Last Office Visit:              6/30/2023   Future Office visit:            None    Zaina Bello RN on 11/7/2024 at 2:26 PM

## 2024-11-07 NOTE — TELEPHONE ENCOUNTER
[facility] message sent:    My father is dying of glioblastoma.  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 and I have not had time to make another appointment.  I have been checking my blood pressure and it is averaging about 120-130 by 89 90s.  I am hoping that you can refill my prescriptions for blood pressure and effexor and I promise to make an appointment for a physical soon.    Thanks  Lcaya     Requested Prescriptions   Pending Prescriptions Disp Refills    lisinopril (ZESTRIL) 20 MG tablet 30 tablet 0     Sig: Take 1 tablet (20 mg) by mouth daily.     Last Prescription Date:   9/30/2024  Last Fill Qty/Refills:         30, R-0        ACE Inhibitors (Including Combos) Protocol Failed - 11/7/2024  2:22 PM        Failed - Most recent blood pressure under 140/90 in past 12 months- Clinicial or Patient Reported     BP Readings from Last 3 Encounters:   06/30/23 (!) 148/88   04/10/23 138/88 03/02/23 (!) 158/88       No data recorded            Failed - Recent (12 mo) or future (90 days) visit within the authorizing provider's specialty     The patient must have completed an in-person or virtual visit within the past 12 months or has a future visit scheduled within the next 90 days with the authorizing provider s specialty.  Urgent care and e-visits do not quality as an office visit for this protocol.          Failed - Most recent GFR on file in the past 12 months >30        Failed - Normal serum potassium on file in past 12 months     Recent Labs   Lab Test 06/30/23  1454   POTASSIUM 3.8         venlafaxine (EFFEXOR XR) 37.5 MG 24 hr capsule 30 capsule 0     Sig: Take 1 capsule (37.5 mg) by mouth daily.       Serotonin-Norepinephrine Reuptake Inhibitors  Failed - 11/7/2024  2:22 PM        Failed - Most recent blood pressure under 140/90 in past 12 months     BP Readings from Last 3 Encounters:   06/30/23 (!) 148/88   04/10/23 138/88 03/02/23 (!) 158/88       No data  recorded            Failed - PHQ-9 score of less than 5 in past 6 months     Please review last PHQ-9 score.           Failed - Recent (12 mo) or future (90 days) visit within the authorizing provider's specialty     The patient must have completed an in-person or virtual visit within the past 12 months or has a future visit scheduled within the next 90 days with the authorizing provider s specialty.  Urgent care and e-visits do not quality as an office visit for this protocol.           Last Prescription Date:   9/30/2024  Last Fill Qty/Refills:         30, R-0    Last Office Visit:              6/30/2023 Physical with [facility]  Future Office visit:            None    Zaina Bello RN on 11/7/2024 at 2:25 PM

## 2024-11-18 NOTE — NURSING NOTE
"Chief Complaint   Patient presents with     Derm Problem     Rash on scalp, chest, arms, back  Originally was diagnosed w/staph on scalp 10/31/22       Initial /80 (BP Location: Right arm, Patient Position: Sitting, Cuff Size: Adult Regular)   Pulse 77   Temp 98.4  F (36.9  C) (Temporal)   Resp 18   LMP  (LMP Unknown)   SpO2 97%  Estimated body mass index is 29.08 kg/m  as calculated from the following:    Height as of 7/28/22: 1.575 m (5' 2\").    Weight as of 7/28/22: 72.1 kg (159 lb).  Medication Reconciliation: complete      FOOD SECURITY SCREENING QUESTIONS:    The next two questions are to help us understand your food security.  If you are feeling you need any assistance in this area, we have resources available to support you today.    Hunger Vital Signs:  Within the past 12 months we worried whether our food would run out before we got money to buy more. Never  Within the past 12 months the food we bought just didn't last and we didn't have money to get more. Never        Advance care plan reviewed      Hayde Pimentel LPN on 11/12/2022 at 11:46 AM      " Patient: Lito Osei    Procedure Summary       Date: 11/18/24 Room / Location: [facility] / [facility]    Anesthesia Start: 1427 Anesthesia Stop: 1631    Procedure: Arthroplasty Partial Hip (Left: Hip) Diagnosis:       Closed fracture of left hip, initial encounter      (Closed fracture of left hip, initial encounter [S72.002A])    Surgeons: Nicola Webster MD Responsible Provider: Steven Andujar MD    Anesthesia Type: general ASA Status: 3            Anesthesia Type: general    Vitals Value Taken Time   /79 11/18/24 1631   Temp 36.8 11/18/24 1631   Pulse 99 11/18/24 1631   Resp 99% 11/18/24 1631   SpO2 99% 11/18/24 1631       Anesthesia Post Evaluation    Patient location during evaluation: bedside  Patient participation: waiting for patient participation  Level of consciousness: sleepy but conscious  Pain score: 0  Pain management: adequate  Multimodal analgesia pain management approach  Airway patency: patent  Cardiovascular status: acceptable  Respiratory status: acceptable, nasal cannula and face mask  Hydration status: acceptable  Postoperative Nausea and Vomiting: none        There were no known notable events for this encounter.

## 2025-01-21 ENCOUNTER — OFFICE VISIT (OUTPATIENT)
Dept: FAMILY MEDICINE | Facility: OTHER | Age: 55
End: 2025-01-21
Attending: NURSE PRACTITIONER
Payer: COMMERCIAL

## 2025-01-21 VITALS
BODY MASS INDEX: 31.65 KG/M2 | WEIGHT: 172 LBS | OXYGEN SATURATION: 97 % | RESPIRATION RATE: 20 BRPM | SYSTOLIC BLOOD PRESSURE: 183 MMHG | HEART RATE: 81 BPM | HEIGHT: 62 IN | TEMPERATURE: 97.8 F | DIASTOLIC BLOOD PRESSURE: 85 MMHG

## 2025-01-21 DIAGNOSIS — H92.02 OTALGIA, LEFT: ICD-10-CM

## 2025-01-21 DIAGNOSIS — H65.92 FLUID LEVEL BEHIND TYMPANIC MEMBRANE OF LEFT EAR: ICD-10-CM

## 2025-01-21 DIAGNOSIS — J02.9 VIRAL PHARYNGITIS: Primary | ICD-10-CM

## 2025-01-21 DIAGNOSIS — J04.0 LARYNGITIS: ICD-10-CM

## 2025-01-21 DIAGNOSIS — R05.1 ACUTE COUGH: ICD-10-CM

## 2025-01-21 DIAGNOSIS — J02.9 SORE THROAT: ICD-10-CM

## 2025-01-21 DIAGNOSIS — H69.93 DYSFUNCTION OF BOTH EUSTACHIAN TUBES: ICD-10-CM

## 2025-01-21 LAB
FLUAV RNA SPEC QL NAA+PROBE: NEGATIVE
FLUBV RNA RESP QL NAA+PROBE: NEGATIVE
RSV RNA SPEC NAA+PROBE: NEGATIVE
S PYO DNA THROAT QL NAA+PROBE: NOT DETECTED
SARS-COV-2 RNA RESP QL NAA+PROBE: NEGATIVE

## 2025-01-21 PROCEDURE — 87070 CULTURE OTHR SPECIMN AEROBIC: CPT | Mod: ZL

## 2025-01-21 PROCEDURE — 250N000009 HC RX 250

## 2025-01-21 PROCEDURE — 87637 SARSCOV2&INF A&B&RSV AMP PRB: CPT | Mod: ZL

## 2025-01-21 PROCEDURE — 87651 STREP A DNA AMP PROBE: CPT | Mod: ZL

## 2025-01-21 RX ORDER — DEXAMETHASONE SODIUM PHOSPHATE 4 MG/ML
12 VIAL (ML) INJECTION ONCE
Status: COMPLETED | OUTPATIENT
Start: 2025-01-21 | End: 2025-01-21

## 2025-01-21 RX ORDER — PREDNISONE 20 MG/1
40 TABLET ORAL DAILY
Qty: 10 TABLET | Refills: 0 | Status: SHIPPED | OUTPATIENT
Start: 2025-01-21 | End: 2025-01-26

## 2025-01-21 RX ADMIN — DEXAMETHASONE SODIUM PHOSPHATE 12 MG: 4 INJECTION, SOLUTION INTRAMUSCULAR; INTRAVENOUS at 16:03

## 2025-01-21 ASSESSMENT — ENCOUNTER SYMPTOMS
COUGH: 1
NAUSEA: 0
MUSCULOSKELETAL NEGATIVE: 1
SINUS PAIN: 0
VOMITING: 0
SINUS PRESSURE: 0
DIARRHEA: 0
CHILLS: 1
CARDIOVASCULAR NEGATIVE: 1
SORE THROAT: 1
FEVER: 0
SHORTNESS OF BREATH: 0

## 2025-01-21 ASSESSMENT — PATIENT HEALTH QUESTIONNAIRE - PHQ9
SUM OF ALL RESPONSES TO PHQ QUESTIONS 1-9: 0
10. IF YOU CHECKED OFF ANY PROBLEMS, HOW DIFFICULT HAVE THESE PROBLEMS MADE IT FOR YOU TO DO YOUR WORK, TAKE CARE OF THINGS AT HOME, OR GET ALONG WITH OTHER PEOPLE: NOT DIFFICULT AT ALL
SUM OF ALL RESPONSES TO PHQ QUESTIONS 1-9: 0

## 2025-01-21 ASSESSMENT — PAIN SCALES - GENERAL: PAINLEVEL_OUTOF10: SEVERE PAIN (10)

## 2025-01-21 NOTE — PROGRESS NOTES
Otis DARDEN Jayshree Frank  1970    ASSESSMENT/PLAN      1. Viral pharyngitis (Primary)  2. Laryngitis  - predniSONE (DELTASONE) 20 MG tablet; Take 2 tablets (40 mg) by mouth daily for 5 days.  Dispense: 10 tablet; Refill: 0  3. Fluid level behind tympanic membrane of left ear  4. Dysfunction of both eustachian tubes  5. Otalgia, left  6. Sore throat  - Group A Streptococcus PCR Throat Swab  - dexAMETHasone (DECADRON) injectable solution used ORALLY 12 mg  - Respiratory Aerobic Bacterial Culture with Gram Stain  7. Acute cough  - Influenza A/B, RSV and SARS-CoV2 PCR (COVID-19) Nose  - predniSONE (DELTASONE) 20 MG tablet; Take 2 tablets (40 mg) by mouth daily for 5 days.  Dispense: 10 tablet; Refill: 0        -Influenza, COVID, RSV testing negative.  Strep test negative.  Throat culture ordered as strep test was negative.  -Decadron 12 mg oral solution provided in clinic.  -Prednisone 40 mg once daily for 5 days.  Recommend starting tomorrow.  Side effects reviewed.  -Recommend vocal rest as able for laryngitis.  Hoarseness should improve over the next 24 to 72 hours.  -Recommended Flonase plus Zyrtec daily for the next few weeks until symptoms resolved.  - Discussed with patient that symptoms and exam are consistent with viral illness.    - No clinical indications for antibiotic treatment at this time.  - Symptomatic treatment - Encouraged fluids, salt water gargles, honey, humidifier, saline nasal spray, lozenges, tea, soup, smoothies, popsicles, topical vapor rub, rest, etc   - May use over-the-counter Tylenol or ibuprofen PRN  - Follow up as needed for new or worsening symptoms        *Explanation of diagnosis, treatment options and risk and benefits of medications reviewed with patient. Patient agrees with plan of care.  *All questions were answered.    *Red flags symptoms were discussed and patient was advised when they should return for reevaluation or for prompt emergency evaluation.   *Patient was given  "verbal and written instructions on plan of care. Instructions were printed or are available on [employer] on electronic AVS.   *We discussed potential side effects of any prescribed or recommended therapies, as well as expectations for response to treatments.  *Patient discharged in stable condition    Neymar Frank, SHAR, APRN, FNP-C  [facility]    SUBJECTIVE  CHIEF COMPLAINT/ REASON FOR VISIT  Patient presents with:  Cough: Dry x3 weeks  Pharyngitis: Hurts to swallow  Ear Problem: left     HISTORY OF PRESENT ILLNESS  Otis Frank is a pleasant 54 year old female presents to rapid clinic today with cough and sore throat.  Approximately 3 weeks ago patient was doing with upper respiratory illness which resolved approximately 2 weeks ago.  Patient has been dealing with a nonproductive cough since that time. This morning patient woke up with sore throat, but no fevers. She reports she lost her voice approximately 1 week ago. She denies any body aches or chills at this time.  She has been taking Tylenol as needed.    History provided by patient      I have reviewed the nursing notes.  I have reviewed allergies, medication list, problem list, and past medical history.    REVIEW OF SYSTEMS  Review of Systems   Constitutional:  Positive for chills. Negative for fever.   HENT:  Positive for ear pain and sore throat. Negative for congestion, sinus pressure and sinus pain.    Respiratory:  Positive for cough. Negative for shortness of breath.    Cardiovascular: Negative.    Gastrointestinal:  Negative for diarrhea, nausea and vomiting.   Musculoskeletal: Negative.    Skin: Negative.         VITAL SIGNS  Vitals:    01/21/25 1520   BP: (!) 183/85   BP Location: Left arm   Patient Position: Sitting   Cuff Size: Adult Regular   Pulse: 81   Resp: 20   Temp: 97.8  F (36.6  C)   TempSrc: Temporal   SpO2: 97%   Weight: 78 kg (172 lb)   Height: 1.575 m (5' 2\")      Body mass index is 31.46 " kg/m .      OBJECTIVE  PHYSICAL EXAM  Physical Exam  Vitals and nursing note reviewed.   Constitutional:       General: She is not in acute distress.     Appearance: Normal appearance. She is normal weight. She is not ill-appearing, toxic-appearing or diaphoretic.   HENT:      Head: Normocephalic and atraumatic.      Right Ear: Ear canal and external ear normal. No middle ear effusion. There is no impacted cerumen. Tympanic membrane is not perforated, erythematous or bulging.      Left Ear: Ear canal and external ear normal. A middle ear effusion is present. There is no impacted cerumen. Tympanic membrane is not perforated, erythematous or bulging.      Nose: Nose normal. No congestion or rhinorrhea.      Mouth/Throat:      Mouth: Mucous membranes are moist.      Pharynx: Oropharynx is clear. Posterior oropharyngeal erythema present. No oropharyngeal exudate.   Eyes:      Conjunctiva/sclera: Conjunctivae normal.   Cardiovascular:      Rate and Rhythm: Normal rate and regular rhythm.      Pulses: Normal pulses.      Heart sounds: Normal heart sounds.   Pulmonary:      Effort: Pulmonary effort is normal. No respiratory distress.      Breath sounds: Normal breath sounds. No wheezing or rhonchi.   Abdominal:      General: Abdomen is flat. Bowel sounds are normal.      Palpations: Abdomen is soft.      Tenderness: There is no abdominal tenderness.   Musculoskeletal:      Cervical back: Normal range of motion. No rigidity or tenderness.   Lymphadenopathy:      Cervical: No cervical adenopathy.   Neurological:      Mental Status: She is alert.          DIAGNOSTICS  Results for orders placed or performed in visit on 01/21/25   Influenza A/B, RSV and SARS-CoV2 PCR (COVID-19) Nose     Status: Normal    Specimen: Nose; Swab   Result Value Ref Range    Influenza A PCR Negative Negative    Influenza B PCR Negative Negative    RSV PCR Negative Negative    SARS CoV2 PCR Negative Negative    Narrative    Testing was performed using  the Xpert Xpress CoV2/Flu/RSV Assay on the [employer] Instrument. This test should be ordered for the detection of SARS-CoV2, influenza, and RSV viruses in individuals with signs and symptoms of respiratory tract infection. This test is for in vitro diagnostic use under the [place] FDA for laboratories certified under CLIA to perform high or moderate complexity testing. This test has been [place] FDA cleared. A negative result does not rule out the presence of PCR inhibitors in the specimen or target RNA in concentration below the limit of detection for the assay. If only one viral target is positive but coinfection with multiple targets is suspected, the sample should be re-tested with another FDA cleared, approved, or authorized test, if coninfection would change clinical management. This test was validated by the [facility]. These laboratories are certified under the Clinical Laboratory Improvement Amendments of 1988 (CLIA-88) as qualified to perfom high complexity laboratory testing.   Group A Streptococcus PCR Throat Swab     Status: Normal    Specimen: Throat; Swab   Result Value Ref Range    Group A strep by PCR Not Detected Not Detected    Narrative    The Xpert Xpress Strep A test, performed on the [employer], is a rapid, qualitative in vitro diagnostic test for the detection of Streptococcus pyogenes (Group A ß-hemolytic Streptococcus, Strep A) in throat swab specimens from patients with signs and symptoms of pharyngitis. The Xpert Xpress Strep A test can be used as an aid in the diagnosis of Group A Streptococcal pharyngitis. The assay is not intended to monitor treatment for Group A Streptococcus infections. The Xpert Xpress Strep A test utilizes an automated real-time polymerase chain reaction (PCR) to detect Streptococcus pyogenes DNA.

## 2025-01-21 NOTE — PROGRESS NOTES
"Chief Complaint   Patient presents with    Cough     Dry x3 weeks    Pharyngitis     Hurts to swallow    Ear Problem     left   Patient is here to be seen for cough sore throat and ear pain.    FOOD SECURITY SCREENING QUESTIONS  Hunger Vital Signs:  Within the past 12 months we worried whether our food would run out before we got money to buy more. Never  Within the past 12 months the food we bought just didn't last and we didn't have money to get more. Never  Tammie Damon 1/21/2025 3:23 PM      Initial BP (!) 183/85 (BP Location: Left arm, Patient Position: Sitting, Cuff Size: Adult Regular)   Pulse 81   Temp 97.8  F (36.6  C) (Temporal)   Resp 20   Ht 1.575 m (5' 2\")   Wt 78 kg (172 lb)   LMP 01/01/2017 (Approximate)   SpO2 97%   BMI 31.46 kg/m   Estimated body mass index is 31.46 kg/m  as calculated from the following:    Height as of this encounter: 1.575 m (5' 2\").    Weight as of this encounter: 78 kg (172 lb).  Medication Reconciliation: complete    Tammie Damon   "

## 2025-01-23 LAB — BACTERIA SPEC CULT: NORMAL

## 2025-01-28 DIAGNOSIS — F34.1 DYSTHYMIC DISORDER: ICD-10-CM

## 2025-01-28 DIAGNOSIS — I10 ESSENTIAL HYPERTENSION: ICD-10-CM

## 2025-01-28 RX ORDER — VENLAFAXINE HYDROCHLORIDE 37.5 MG/1
37.5 CAPSULE, EXTENDED RELEASE ORAL DAILY
Qty: 14 CAPSULE | Refills: 0 | Status: SHIPPED | OUTPATIENT
Start: 2025-01-28

## 2025-01-28 RX ORDER — AMLODIPINE BESYLATE 10 MG/1
10 TABLET ORAL DAILY
Qty: 14 TABLET | Refills: 0 | Status: SHIPPED | OUTPATIENT
Start: 2025-01-28

## 2025-01-28 RX ORDER — LISINOPRIL 20 MG/1
20 TABLET ORAL DAILY
Qty: 14 TABLET | Refills: 0 | Status: SHIPPED | OUTPATIENT
Start: 2025-01-28

## 2025-01-28 NOTE — TELEPHONE ENCOUNTER
Patient has PX scheduled with [facility] on 2/6/25, but will run out of medications in a week  Requesting refills or partial to tide over.  Prescriptions are pended.  Please advise.    Hayde Pimentel LPN on 1/28/2025 at 11:50 AM

## 2025-01-28 NOTE — TELEPHONE ENCOUNTER
PT Is looking to have her meds refilled until she can get in for her PHY which we are working on scheduling. Her BP meds and Effexor. Please reach out to her

## 2025-01-28 NOTE — TELEPHONE ENCOUNTER
PT has a PX scheduled with [facility] on Feb 6th but will run out of her meds in a week. Looking to either get a partial refill to make it until her appt or a months worth.   Ginger Valderrama on 1/28/2025 at 11:00 AM

## 2025-02-19 PROBLEM — I10 HYPERTENSION: Status: ACTIVE | Noted: 2017-07-26

## 2025-02-20 SDOH — HEALTH STABILITY: PHYSICAL HEALTH: ON AVERAGE, HOW MANY MINUTES DO YOU ENGAGE IN EXERCISE AT THIS LEVEL?: 90 MIN

## 2025-02-20 SDOH — HEALTH STABILITY: PHYSICAL HEALTH: ON AVERAGE, HOW MANY DAYS PER WEEK DO YOU ENGAGE IN MODERATE TO STRENUOUS EXERCISE (LIKE A BRISK WALK)?: 7 DAYS

## 2025-02-20 ASSESSMENT — SOCIAL DETERMINANTS OF HEALTH (SDOH): HOW OFTEN DO YOU GET TOGETHER WITH FRIENDS OR RELATIVES?: PATIENT DECLINED

## 2025-02-21 ENCOUNTER — OFFICE VISIT (OUTPATIENT)
Dept: FAMILY MEDICINE | Facility: OTHER | Age: 55
End: 2025-02-21
Attending: PHYSICIAN ASSISTANT
Payer: COMMERCIAL

## 2025-02-21 DIAGNOSIS — J06.9 VIRAL URI: ICD-10-CM

## 2025-02-21 DIAGNOSIS — I10 ESSENTIAL HYPERTENSION: ICD-10-CM

## 2025-02-21 DIAGNOSIS — Z12.31 ENCOUNTER FOR SCREENING MAMMOGRAM FOR BREAST CANCER: ICD-10-CM

## 2025-02-21 DIAGNOSIS — Z00.00 ROUTINE HISTORY AND PHYSICAL EXAMINATION OF ADULT: Primary | ICD-10-CM

## 2025-02-21 DIAGNOSIS — F34.1 DYSTHYMIC DISORDER: ICD-10-CM

## 2025-02-21 PROBLEM — Z12.11 SPECIAL SCREENING FOR MALIGNANT NEOPLASMS, COLON: Status: RESOLVED | Noted: 2020-03-09 | Resolved: 2025-02-21

## 2025-02-21 LAB
ANION GAP SERPL CALCULATED.3IONS-SCNC: 15 MMOL/L (ref 7–15)
BASOPHILS # BLD AUTO: 0.1 10E3/UL (ref 0–0.2)
BASOPHILS NFR BLD AUTO: 1 %
BUN SERPL-MCNC: 12.8 MG/DL (ref 6–20)
CALCIUM SERPL-MCNC: 9.9 MG/DL (ref 8.8–10.4)
CHLORIDE SERPL-SCNC: 98 MMOL/L (ref 98–107)
CHOLEST SERPL-MCNC: 389 MG/DL
CREAT SERPL-MCNC: 0.66 MG/DL (ref 0.51–0.95)
EGFRCR SERPLBLD CKD-EPI 2021: >90 ML/MIN/1.73M2
EOSINOPHIL # BLD AUTO: 0.1 10E3/UL (ref 0–0.7)
EOSINOPHIL NFR BLD AUTO: 1 %
ERYTHROCYTE [DISTWIDTH] IN BLOOD BY AUTOMATED COUNT: 13.1 % (ref 10–15)
FASTING STATUS PATIENT QL REPORTED: NO
FASTING STATUS PATIENT QL REPORTED: NO
GLUCOSE SERPL-MCNC: 120 MG/DL (ref 70–99)
HCO3 SERPL-SCNC: 22 MMOL/L (ref 22–29)
HCT VFR BLD AUTO: 39.7 % (ref 35–47)
HDLC SERPL-MCNC: 125 MG/DL
HGB BLD-MCNC: 13.8 G/DL (ref 11.7–15.7)
IMM GRANULOCYTES # BLD: 0 10E3/UL
IMM GRANULOCYTES NFR BLD: 0 %
LDLC SERPL CALC-MCNC: 250 MG/DL
LYMPHOCYTES # BLD AUTO: 1.6 10E3/UL (ref 0.8–5.3)
LYMPHOCYTES NFR BLD AUTO: 19 %
MCH RBC QN AUTO: 31.9 PG (ref 26.5–33)
MCHC RBC AUTO-ENTMCNC: 34.8 G/DL (ref 31.5–36.5)
MCV RBC AUTO: 92 FL (ref 78–100)
MONOCYTES # BLD AUTO: 0.4 10E3/UL (ref 0–1.3)
MONOCYTES NFR BLD AUTO: 5 %
NEUTROPHILS # BLD AUTO: 5.9 10E3/UL (ref 1.6–8.3)
NEUTROPHILS NFR BLD AUTO: 73 %
NONHDLC SERPL-MCNC: 264 MG/DL
NRBC # BLD AUTO: 0 10E3/UL
NRBC BLD AUTO-RTO: 0 /100
PLATELET # BLD AUTO: 503 10E3/UL (ref 150–450)
POTASSIUM SERPL-SCNC: 3.8 MMOL/L (ref 3.4–5.3)
RBC # BLD AUTO: 4.32 10E6/UL (ref 3.8–5.2)
SODIUM SERPL-SCNC: 135 MMOL/L (ref 135–145)
TRIGL SERPL-MCNC: 68 MG/DL
WBC # BLD AUTO: 8.1 10E3/UL (ref 4–11)

## 2025-02-21 PROCEDURE — 80061 LIPID PANEL: CPT | Mod: ZL | Performed by: PHYSICIAN ASSISTANT

## 2025-02-21 PROCEDURE — 80048 BASIC METABOLIC PNL TOTAL CA: CPT | Mod: ZL | Performed by: PHYSICIAN ASSISTANT

## 2025-02-21 PROCEDURE — 85025 COMPLETE CBC W/AUTO DIFF WBC: CPT | Mod: ZL | Performed by: PHYSICIAN ASSISTANT

## 2025-02-21 PROCEDURE — 36415 COLL VENOUS BLD VENIPUNCTURE: CPT | Mod: ZL | Performed by: PHYSICIAN ASSISTANT

## 2025-02-21 RX ORDER — PREDNISONE 20 MG/1
40 TABLET ORAL DAILY
Qty: 10 TABLET | Refills: 0 | Status: SHIPPED | OUTPATIENT
Start: 2025-02-21

## 2025-02-21 RX ORDER — VENLAFAXINE HYDROCHLORIDE 37.5 MG/1
75 CAPSULE, EXTENDED RELEASE ORAL DAILY
Qty: 180 CAPSULE | Refills: 4 | Status: SHIPPED | OUTPATIENT
Start: 2025-02-21

## 2025-02-21 RX ORDER — LISINOPRIL 20 MG/1
20 TABLET ORAL DAILY
Qty: 90 TABLET | Refills: 4 | Status: SHIPPED | OUTPATIENT
Start: 2025-02-21

## 2025-02-21 RX ORDER — AMLODIPINE BESYLATE 10 MG/1
10 TABLET ORAL DAILY
Qty: 90 TABLET | Refills: 4 | Status: SHIPPED | OUTPATIENT
Start: 2025-02-21

## 2025-02-21 RX ORDER — VENLAFAXINE HYDROCHLORIDE 37.5 MG/1
37.5 CAPSULE, EXTENDED RELEASE ORAL DAILY
Qty: 14 CAPSULE | Refills: 0 | Status: CANCELLED | OUTPATIENT
Start: 2025-02-21

## 2025-02-21 ASSESSMENT — PAIN SCALES - GENERAL: PAINLEVEL_OUTOF10: NO PAIN (0)

## 2025-02-21 NOTE — PROGRESS NOTES
Preventive Care Visit  [facility]  She Gaspar PA-C, Family Medicine  Feb 21, 2025    Assessment & Plan     Routine history and physical examination of adult  Mammogram ordered to be updated as below.  Encouraged patient to continue with regular screenings for risk reduction as well as mother's history of breast cancer.  Otherwise up-to-date on preventative screenings.  Labs as below.  Discussed pneumococcal and zoster vaccines however she is currently dealing with illness and would like to hold off for now.  She will return at later date to have these completed.  - Lipid Profile; Future  - CBC with Platelets & Differential; Future  - Lipid Profile  - CBC with Platelets & Differential    Encounter for screening mammogram for breast cancer  - MA Screen Bilateral w/[place]; Future    Dysthymic disorder  Chronic, dealing with increased anxiety due to significant home, family, life changes.  Would like to slightly increase dose to 75 mg and she feels she would be comfortable decreasing back down to 37.5 once her anxiety is under better control over the next several months.  Follow-up with myself or PCP going forward as needed.  - venlafaxine (EFFEXOR XR) 37.5 MG 24 hr capsule; Take 2 capsules (75 mg) by mouth daily.    Essential hypertension  Chronic, has been stable however she has been out of medications for the last few days.  Reports she been well-controlled when on medications.  Refilled as below.  - BASIC METABOLIC PANEL; Future  - amLODIPine (NORVASC) 10 MG tablet; Take 1 tablet (10 mg) by mouth daily.  - lisinopril (ZESTRIL) 20 MG tablet; Take 1 tablet (20 mg) by mouth daily.  - BASIC METABOLIC PANEL    Viral URI  Exam consistent consistent with viral URI.  Has done well with prednisone in the past, refilled as below.  Follow-up if symptoms persist or worsen.  - predniSONE (DELTASONE) 20 MG tablet; Take 2 tablets (40 mg) by mouth daily.          BMI  Estimated body mass index is 31.17  "kg/m  as calculated from the following:    Height as of 1/21/25: 1.575 m (5' 2\").    Weight as of this encounter: 77.3 kg (170 lb 6.4 oz).   Weight management plan: Discussed healthy diet and exercise guidelines    Counseling  Appropriate preventive services were addressed with this patient via screening, questionnaire, or discussion as appropriate for fall prevention, nutrition, physical activity, Tobacco-use cessation, social engagement, weight loss and cognition.  Checklist reviewing preventive services available has been given to the patient.  Reviewed patient's diet, addressing concerns and/or questions.   The patient was instructed to see the dentist every 6 months.   She is at risk for psychosocial distress and has been provided with information to reduce risk.           No follow-ups on file.    Alec Berg is a 54 year old, presenting for the following:  Physical           HPI  Here for annual physical.    Mental health:  Longstanding dysthymic disorder for which she continues on Effexor 37.5 mg once daily.  Overall tolerating medication well.  However she reports over the last several months she has had significant family and home life changes with family members passing, has been still struggling as well as her brother recently moving in with him.  She is struggling with increased anxiety due to the significant changes.  In the past she has tolerated higher doses of Effexor and is wondering if she could slightly increase for now.    Hypertension:  Chronic, has been stable on amlodipine and lisinopril.  She has been out of medications for the last few days her blood pressure has not been as well-controlled.  No current cardiac symptoms.  Due for screening labs.    Hyperlipidemia:  Previously noted on labs.  Had been prescribed statin previously but patient prefers to focus on lifestyle management.  Reports she has been paying much closer attention to lifestyle over the last 3 months since " discontinuing the statin.  She is significantly reduced her sugar intake as well as increase fruits and vegetables.  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  Has been doing ice baths regularly as well.  Overall feeling well.    URI:  Last few days has been struggling with cough, runny nose, generally not feeling well, nasal congestion and drainage.  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  Had previously done well with course of prednisone for symptomatic management is wondering if she could have repeat prescription at this time.  No significant fever/chills, shortness of breath, wheezing, GI symptoms, rash.    Risk for STI?: No  Last pap: 03/2021 NIL HPV-  FH of early CA?: Mother- breast cancer  Cholesterol/DM concerns/screening: Due  Tobacco?: No  Calcium intake: Dietary  DEXA: NA  Last mammo: 07/2023  Colonoscopy: 04/2022 negative Cologuard  Immunizations: PCV, Hep B, flu, COVID      Health Care Directive  Patient does not have a Health Care Directive:       2/20/2025   General Health   How would you rate your overall physical health? Good   Feel stress (tense, anxious, or unable to sleep) Rather much   (!) STRESS CONCERN      2/20/2025   Nutrition   Three or more servings of calcium each day? Yes   Diet: Breakfast skipped   How many servings of fruit and vegetables per day? (!) 2-3   How many sweetened beverages each day? 0-1         2/20/2025   Exercise   Days per week of moderate/strenous exercise 7 days   Average minutes spent exercising at this level 90 min         2/20/2025   Social Factors   Frequency of gathering with friends or relatives Patient declined   Worry food won't last until get money to buy more No   Food not last or not have enough money for food? No   Do you have housing? (Housing is defined as stable permanent housing and does not include staying ouside in a car, in a tent, in an  abandoned building, in an overnight shelter, or couch-surfing.) Yes   Are you worried about losing your housing? No   Lack of transportation? No   Unable to get utilities (heat,electricity)? No         2/20/2025   Fall Risk   Fallen 2 or more times in the past year? No   Trouble with walking or balance? No          2/20/2025   Dental   Dentist two times every year? (!) NO            Today's PHQ-9 Score:       1/21/2025     3:19 PM   PHQ-9 SCORE   PHQ-9 Total Score MyChart 0   PHQ-9 Total Score 0        Proxy-reported           2/20/2025   Substance Use   Alcohol more than 3/day or more than 7/wk No   Do you use any other substances recreationally? No     Social History     Tobacco Use    Smoking status: Never    Smokeless tobacco: Never   Vaping Use    Vaping status: Never Used   Substance Use Topics    Alcohol use: Yes     Comment: Alcoholic Drinks/day: 1-2 times per week    Drug use: Never           7/24/2023   LAST FHS-7 RESULTS   1st degree relative breast or ovarian cancer Yes   Any relative bilateral breast cancer No   Any male have breast cancer No   Any ONE woman have BOTH breast AND ovarian cancer No   Any woman with breast cancer before 50yrs Yes   2 or more relatives with breast AND/OR ovarian cancer No   2 or more relatives with breast AND/OR bowel cancer No        Mammogram Screening - Mammogram every 1-2 years updated in Health Maintenance based on mutual decision making        2/20/2025   STI Screening   New sexual partner(s) since last STI/HIV test? No     History of abnormal Pap smear: YES - reflected in Problem List and Health Maintenance accordingly        Latest Ref Rng & Units 3/24/2021     8:53 AM   PAP / HPV   PAP (Historical)  NIL    HPV 16 DNA NEG^Negative Negative    HPV 18 DNA NEG^Negative Negative    Other HR HPV NEG^Negative Negative      ASCVD Risk   The ASCVD Risk score (Hilda HERRERA, et al., 2019) failed to calculate for the following reasons:    The valid HDL cholesterol range  "is 20 to 100 mg/dL    The valid total cholesterol range is 130 to 320 mg/dL           Reviewed and updated as needed this visit by Provider                    Past Medical History:   Diagnosis Date    Anxiety disorder     No Comments Provided    Essential (primary) hypertension     No Comments Provided    Personal history of other infectious and parasitic diseases     No Comments Provided     Past Surgical History:   Procedure Laterality Date    ARTHROSCOPY KNEE      82, 88,Knee surgery x2    ARTHROSCOPY KNEE Left     [place]    BIOPSY BREAST      -, Breast bx - benign    MAMMOPLASTY REDUCTION BILATERAL Bilateral      OB History    Para Term  AB Living   2 2 2 0 0 2   SAB IAB Ectopic Multiple Live Births   0 0 0 0 0            Objective    Exam  /88   Pulse 92   Temp 98.8  F (37.1  C) (Tympanic)   Resp 16   Wt 77.3 kg (170 lb 6.4 oz)   LMP 2017 (Approximate)   SpO2 98%   BMI 31.17 kg/m     Estimated body mass index is 31.17 kg/m  as calculated from the following:    Height as of 25: 1.575 m (5' 2\").    Weight as of this encounter: 77.3 kg (170 lb 6.4 oz).    Physical Exam  GENERAL: alert and no distress  EYES: Eyes grossly normal to inspection, PERRL and conjunctivae and sclerae normal  HENT: ear canals and TM's normal, nose and mouth without ulcers or lesions  RESP: lungs clear to auscultation - no rales, rhonchi or wheezes  CV: regular rate and rhythm, normal S1 S2, no S3 or S4, no murmur, click or rub, no peripheral edema  MS: no gross musculoskeletal defects noted, no edema  SKIN: no suspicious lesions or rashes  NEURO: Normal strength and tone, mentation intact and speech normal  PSYCH: mentation appears normal, affect normal/bright        Signed Electronically by: She Gaspar PA-C    "

## 2025-02-21 NOTE — NURSING NOTE
"Chief Complaint   Patient presents with    Physical       Initial BP (!) 166/90   Pulse 92   Temp 98.8  F (37.1  C) (Tympanic)   Resp 16   Wt 77.3 kg (170 lb 6.4 oz)   LMP 01/01/2017 (Approximate)   SpO2 98%   BMI 31.17 kg/m   Estimated body mass index is 31.17 kg/m  as calculated from the following:    Height as of 1/21/25: 1.575 m (5' 2\").    Weight as of this encounter: 77.3 kg (170 lb 6.4 oz).  Medication Review: complete    The next two questions are to help us understand your food security.  If you are feeling you need any assistance in this area, we have resources available to support you today.          2/20/2025   SDOH- Food Insecurity   Within the past 12 months, did you worry that your food would run out before you got money to buy more? N   Within the past 12 months, did the food you bought just not last and you didn t have money to get more? N         Health Care Directive:  Patient does not have a Health Care Directive: Discussed advance care planning with patient; however, patient declined at this time.    Patti Fairbanks LPN      "

## 2025-02-22 ENCOUNTER — MYC MEDICAL ADVICE (OUTPATIENT)
Dept: FAMILY MEDICINE | Facility: OTHER | Age: 55
End: 2025-02-22
Payer: COMMERCIAL

## 2025-02-22 VITALS
DIASTOLIC BLOOD PRESSURE: 88 MMHG | BODY MASS INDEX: 31.17 KG/M2 | TEMPERATURE: 98.8 F | SYSTOLIC BLOOD PRESSURE: 138 MMHG | WEIGHT: 170.4 LBS | HEART RATE: 92 BPM | OXYGEN SATURATION: 98 % | RESPIRATION RATE: 16 BRPM

## 2025-02-22 DIAGNOSIS — E78.49 HYPERLIPIDEMIA, FAMILIAL, HIGH LDL: ICD-10-CM

## 2025-02-24 RX ORDER — ROSUVASTATIN CALCIUM 40 MG/1
40 TABLET, COATED ORAL DAILY
Qty: 90 TABLET | Refills: 3 | Status: SHIPPED | OUTPATIENT
Start: 2025-02-24

## 2025-02-24 NOTE — TELEPHONE ENCOUNTER
Wondering if she should be on a statin again since her cholesterol is so high? Will improve diet to get glucose level down.     Per comment on labs from 2/24:  Cholesterol levels did return significantly further elevated compared to previous.  This is likely due to previously having been on a statin.  Options at this time are to continue to focus on healthy lifestyle as you have been with continued improvement in diet and physical activity and recheck fasting labs in 3 to 6 months or to consider an alternative medication to statins or restarting a statin as he would previously been on.     On Crestor 40 mg daily in the past.     Elias'd up Crestor 40 mg daily.     Routing to provider to review and respond.  Elvia Martin RN on 2/24/2025 at 12:42 PM

## 2025-02-24 NOTE — TELEPHONE ENCOUNTER
Agree with restarting statin. Rx sent. Continue to focus on lifestyle She Gaspar PA-C on 2/24/2025 at 2:53 PM

## 2025-06-30 ENCOUNTER — PATIENT OUTREACH (OUTPATIENT)
Dept: CARE COORDINATION | Facility: CLINIC | Age: 55
End: 2025-06-30
Payer: COMMERCIAL

## 2025-08-18 ENCOUNTER — MYC MEDICAL ADVICE (OUTPATIENT)
Dept: FAMILY MEDICINE | Facility: OTHER | Age: 55
End: 2025-08-18
Payer: COMMERCIAL

## 2025-08-20 ENCOUNTER — HOSPITAL ENCOUNTER (OUTPATIENT)
Dept: MAMMOGRAPHY | Facility: OTHER | Age: 55
Discharge: HOME | End: 2025-08-20
Attending: PHYSICIAN ASSISTANT
Payer: COMMERCIAL

## 2025-08-20 DIAGNOSIS — Z12.31 ENCOUNTER FOR SCREENING MAMMOGRAM FOR BREAST CANCER: ICD-10-CM

## 2025-08-20 PROCEDURE — 77063 BREAST TOMOSYNTHESIS BI: CPT

## 2025-08-22 ENCOUNTER — HOSPITAL ENCOUNTER (OUTPATIENT)
Dept: MAMMOGRAPHY | Facility: OTHER | Age: 55
Discharge: HOME | End: 2025-08-22
Attending: PHYSICIAN ASSISTANT
Payer: COMMERCIAL

## 2025-08-22 ENCOUNTER — HOSPITAL ENCOUNTER (OUTPATIENT)
Dept: ULTRASOUND IMAGING | Facility: OTHER | Age: 55
Discharge: HOME | End: 2025-08-22
Attending: PHYSICIAN ASSISTANT
Payer: COMMERCIAL

## 2025-08-22 DIAGNOSIS — R92.8 ABNORMAL FINDING ON BREAST IMAGING: ICD-10-CM

## 2025-08-22 PROCEDURE — 76642 ULTRASOUND BREAST LIMITED: CPT | Mod: 26 | Performed by: RADIOLOGY

## 2025-08-22 PROCEDURE — 76642 ULTRASOUND BREAST LIMITED: CPT | Mod: LT

## 2025-08-22 PROCEDURE — 77065 DX MAMMO INCL CAD UNI: CPT | Mod: LT

## 2025-08-22 PROCEDURE — 77065 DX MAMMO INCL CAD UNI: CPT | Mod: 26 | Performed by: RADIOLOGY

## 2025-08-22 PROCEDURE — G0279 TOMOSYNTHESIS, MAMMO: HCPCS | Mod: 26 | Performed by: RADIOLOGY

## 2025-08-26 ENCOUNTER — HOSPITAL ENCOUNTER (OUTPATIENT)
Dept: ULTRASOUND IMAGING | Facility: OTHER | Age: 55
Discharge: HOME | End: 2025-08-26
Attending: PHYSICIAN ASSISTANT
Payer: COMMERCIAL

## 2025-08-26 ENCOUNTER — HOSPITAL ENCOUNTER (OUTPATIENT)
Dept: MAMMOGRAPHY | Facility: OTHER | Age: 55
Discharge: HOME | End: 2025-08-26
Attending: PHYSICIAN ASSISTANT
Payer: COMMERCIAL

## 2025-08-26 ENCOUNTER — MYC MEDICAL ADVICE (OUTPATIENT)
Dept: GENERAL RADIOLOGY | Facility: OTHER | Age: 55
End: 2025-08-26

## 2025-08-26 VITALS
RESPIRATION RATE: 20 BRPM | TEMPERATURE: 98.9 F | OXYGEN SATURATION: 99 % | HEART RATE: 85 BPM | SYSTOLIC BLOOD PRESSURE: 160 MMHG | DIASTOLIC BLOOD PRESSURE: 87 MMHG

## 2025-08-26 DIAGNOSIS — R92.8 ABNORMAL FINDING ON BREAST IMAGING: ICD-10-CM

## 2025-08-26 PROCEDURE — 19083 BX BREAST 1ST LESION US IMAG: CPT | Mod: LT

## 2025-08-26 PROCEDURE — 250N000009 HC RX 250: Performed by: STUDENT IN AN ORGANIZED HEALTH CARE EDUCATION/TRAINING PROGRAM

## 2025-08-26 PROCEDURE — 999N000065 MA POST PROCEDURE LEFT

## 2025-08-26 PROCEDURE — 250N000011 HC RX IP 250 OP 636: Performed by: STUDENT IN AN ORGANIZED HEALTH CARE EDUCATION/TRAINING PROGRAM

## 2025-08-26 RX ORDER — LIDOCAINE HYDROCHLORIDE AND EPINEPHRINE 10; 10 MG/ML; UG/ML
10-20 INJECTION, SOLUTION INFILTRATION; PERINEURAL
Status: COMPLETED | OUTPATIENT
Start: 2025-08-26 | End: 2025-08-26

## 2025-08-26 RX ADMIN — LIDOCAINE HYDROCHLORIDE 10 ML: 10 INJECTION, SOLUTION INFILTRATION; PERINEURAL at 13:43

## 2025-08-26 RX ADMIN — LIDOCAINE HYDROCHLORIDE AND EPINEPHRINE 20 ML: 10; 10 INJECTION, SOLUTION INFILTRATION; PERINEURAL at 13:43

## 2025-08-28 LAB
PATH REPORT.COMMENTS IMP SPEC: NORMAL
PATH REPORT.FINAL DX SPEC: NORMAL
PHOTO IMAGE: NORMAL

## (undated) RX ORDER — TRIAMCINOLONE ACETONIDE 40 MG/ML
INJECTION, SUSPENSION INTRA-ARTICULAR; INTRAMUSCULAR
Status: DISPENSED
Start: 2023-04-10

## (undated) RX ORDER — LIDOCAINE HYDROCHLORIDE AND EPINEPHRINE 10; 10 MG/ML; UG/ML
INJECTION, SOLUTION INFILTRATION; PERINEURAL
Status: DISPENSED
Start: 2025-08-26

## (undated) RX ORDER — DEXAMETHASONE SODIUM PHOSPHATE 4 MG/ML
INJECTION, SOLUTION INTRA-ARTICULAR; INTRALESIONAL; INTRAMUSCULAR; INTRAVENOUS; SOFT TISSUE
Status: DISPENSED
Start: 2025-01-21

## (undated) RX ORDER — LIDOCAINE HYDROCHLORIDE 10 MG/ML
INJECTION, SOLUTION INFILTRATION; PERINEURAL
Status: DISPENSED
Start: 2025-08-26

## (undated) RX ORDER — TRIAMCINOLONE ACETONIDE 40 MG/ML
INJECTION, SUSPENSION INTRA-ARTICULAR; INTRAMUSCULAR
Status: DISPENSED
Start: 2022-11-12